# Patient Record
Sex: MALE | Race: OTHER | Employment: UNEMPLOYED | URBAN - METROPOLITAN AREA
[De-identification: names, ages, dates, MRNs, and addresses within clinical notes are randomized per-mention and may not be internally consistent; named-entity substitution may affect disease eponyms.]

---

## 2024-08-27 ENCOUNTER — APPOINTMENT (EMERGENCY)
Dept: CT IMAGING | Facility: HOSPITAL | Age: 49
DRG: 720 | End: 2024-08-27
Payer: COMMERCIAL

## 2024-08-27 ENCOUNTER — HOSPITAL ENCOUNTER (INPATIENT)
Facility: HOSPITAL | Age: 49
LOS: 2 days | Discharge: HOME | DRG: 720 | End: 2024-08-29
Attending: EMERGENCY MEDICINE | Admitting: INTERNAL MEDICINE
Payer: COMMERCIAL

## 2024-08-27 ENCOUNTER — APPOINTMENT (EMERGENCY)
Dept: ULTRASOUND IMAGING | Facility: HOSPITAL | Age: 49
DRG: 720 | End: 2024-08-27
Payer: COMMERCIAL

## 2024-08-27 DIAGNOSIS — N39.0 UTI (URINARY TRACT INFECTION): ICD-10-CM

## 2024-08-27 DIAGNOSIS — N20.0 NEPHROLITHIASIS: ICD-10-CM

## 2024-08-27 DIAGNOSIS — N20.1 URETERAL CALCULI: Primary | ICD-10-CM

## 2024-08-27 PROBLEM — E87.6 HYPOKALEMIA: Status: ACTIVE | Noted: 2024-08-27

## 2024-08-27 PROBLEM — R65.10 SIRS (SYSTEMIC INFLAMMATORY RESPONSE SYNDROME) (HCC): Status: ACTIVE | Noted: 2024-08-27

## 2024-08-27 LAB
ANION GAP SERPL CALCULATED.3IONS-SCNC: 7 MMOL/L (ref 4–13)
BACTERIA UR QL AUTO: ABNORMAL /HPF
BASOPHILS # BLD AUTO: 0.06 THOUSANDS/ÂΜL (ref 0–0.1)
BASOPHILS NFR BLD AUTO: 0 % (ref 0–1)
BILIRUB UR QL STRIP: NEGATIVE
BUN SERPL-MCNC: 11 MG/DL (ref 5–25)
CALCIUM SERPL-MCNC: 8.9 MG/DL (ref 8.4–10.2)
CHLORIDE SERPL-SCNC: 104 MMOL/L (ref 96–108)
CLARITY UR: CLEAR
CO2 SERPL-SCNC: 24 MMOL/L (ref 21–32)
COLOR UR: YELLOW
CREAT SERPL-MCNC: 0.83 MG/DL (ref 0.6–1.3)
EOSINOPHIL # BLD AUTO: 0.05 THOUSAND/ÂΜL (ref 0–0.61)
EOSINOPHIL NFR BLD AUTO: 0 % (ref 0–6)
ERYTHROCYTE [DISTWIDTH] IN BLOOD BY AUTOMATED COUNT: 13.2 % (ref 11.6–15.1)
FLUAV RNA RESP QL NAA+PROBE: NEGATIVE
FLUBV RNA RESP QL NAA+PROBE: NEGATIVE
GFR SERPL CREATININE-BSD FRML MDRD: 105 ML/MIN/1.73SQ M
GLUCOSE SERPL-MCNC: 112 MG/DL (ref 65–140)
GLUCOSE UR STRIP-MCNC: NEGATIVE MG/DL
HCT VFR BLD AUTO: 41.7 % (ref 36.5–49.3)
HGB BLD-MCNC: 14 G/DL (ref 12–17)
HGB UR QL STRIP.AUTO: ABNORMAL
IMM GRANULOCYTES # BLD AUTO: 0.1 THOUSAND/UL (ref 0–0.2)
IMM GRANULOCYTES NFR BLD AUTO: 1 % (ref 0–2)
KETONES UR STRIP-MCNC: ABNORMAL MG/DL
LEUKOCYTE ESTERASE UR QL STRIP: ABNORMAL
LYMPHOCYTES # BLD AUTO: 1.4 THOUSANDS/ÂΜL (ref 0.6–4.47)
LYMPHOCYTES NFR BLD AUTO: 7 % (ref 14–44)
MCH RBC QN AUTO: 29.9 PG (ref 26.8–34.3)
MCHC RBC AUTO-ENTMCNC: 33.6 G/DL (ref 31.4–37.4)
MCV RBC AUTO: 89 FL (ref 82–98)
MONOCYTES # BLD AUTO: 1.24 THOUSAND/ÂΜL (ref 0.17–1.22)
MONOCYTES NFR BLD AUTO: 7 % (ref 4–12)
MUCOUS THREADS UR QL AUTO: ABNORMAL
NEUTROPHILS # BLD AUTO: 15.97 THOUSANDS/ÂΜL (ref 1.85–7.62)
NEUTS SEG NFR BLD AUTO: 85 % (ref 43–75)
NITRITE UR QL STRIP: NEGATIVE
NON-SQ EPI CELLS URNS QL MICRO: ABNORMAL /HPF
NRBC BLD AUTO-RTO: 0 /100 WBCS
PH UR STRIP.AUTO: 7 [PH] (ref 4.5–8)
PLATELET # BLD AUTO: 201 THOUSANDS/UL (ref 149–390)
PMV BLD AUTO: 9.6 FL (ref 8.9–12.7)
POTASSIUM SERPL-SCNC: 3.4 MMOL/L (ref 3.5–5.3)
PROT UR STRIP-MCNC: NEGATIVE MG/DL
RBC # BLD AUTO: 4.69 MILLION/UL (ref 3.88–5.62)
RBC #/AREA URNS AUTO: ABNORMAL /HPF
RSV RNA RESP QL NAA+PROBE: NEGATIVE
SARS-COV-2 RNA RESP QL NAA+PROBE: NEGATIVE
SODIUM SERPL-SCNC: 135 MMOL/L (ref 135–147)
SP GR UR STRIP.AUTO: 1.01 (ref 1–1.03)
UROBILINOGEN UR QL STRIP.AUTO: 0.2 E.U./DL
WBC # BLD AUTO: 18.82 THOUSAND/UL (ref 4.31–10.16)
WBC #/AREA URNS AUTO: ABNORMAL /HPF

## 2024-08-27 PROCEDURE — 96375 TX/PRO/DX INJ NEW DRUG ADDON: CPT

## 2024-08-27 PROCEDURE — 99233 SBSQ HOSP IP/OBS HIGH 50: CPT | Performed by: INTERNAL MEDICINE

## 2024-08-27 PROCEDURE — 87077 CULTURE AEROBIC IDENTIFY: CPT

## 2024-08-27 PROCEDURE — 87186 SC STD MICRODIL/AGAR DIL: CPT

## 2024-08-27 PROCEDURE — 96376 TX/PRO/DX INJ SAME DRUG ADON: CPT

## 2024-08-27 PROCEDURE — 99223 1ST HOSP IP/OBS HIGH 75: CPT | Performed by: INTERNAL MEDICINE

## 2024-08-27 PROCEDURE — 0241U HB NFCT DS VIR RESP RNA 4 TRGT: CPT | Performed by: PHYSICIAN ASSISTANT

## 2024-08-27 PROCEDURE — 99284 EMERGENCY DEPT VISIT MOD MDM: CPT

## 2024-08-27 PROCEDURE — 76775 US EXAM ABDO BACK WALL LIM: CPT

## 2024-08-27 PROCEDURE — 85025 COMPLETE CBC W/AUTO DIFF WBC: CPT | Performed by: PHYSICIAN ASSISTANT

## 2024-08-27 PROCEDURE — 96361 HYDRATE IV INFUSION ADD-ON: CPT

## 2024-08-27 PROCEDURE — 81001 URINALYSIS AUTO W/SCOPE: CPT

## 2024-08-27 PROCEDURE — 96365 THER/PROPH/DIAG IV INF INIT: CPT

## 2024-08-27 PROCEDURE — 87086 URINE CULTURE/COLONY COUNT: CPT

## 2024-08-27 PROCEDURE — 36415 COLL VENOUS BLD VENIPUNCTURE: CPT | Performed by: PHYSICIAN ASSISTANT

## 2024-08-27 PROCEDURE — 74177 CT ABD & PELVIS W/CONTRAST: CPT

## 2024-08-27 PROCEDURE — 80048 BASIC METABOLIC PNL TOTAL CA: CPT | Performed by: PHYSICIAN ASSISTANT

## 2024-08-27 RX ORDER — KETOROLAC TROMETHAMINE 30 MG/ML
15 INJECTION, SOLUTION INTRAMUSCULAR; INTRAVENOUS ONCE
Status: COMPLETED | OUTPATIENT
Start: 2024-08-27 | End: 2024-08-27

## 2024-08-27 RX ORDER — SODIUM CHLORIDE, SODIUM GLUCONATE, SODIUM ACETATE, POTASSIUM CHLORIDE, MAGNESIUM CHLORIDE, SODIUM PHOSPHATE, DIBASIC, AND POTASSIUM PHOSPHATE .53; .5; .37; .037; .03; .012; .00082 G/100ML; G/100ML; G/100ML; G/100ML; G/100ML; G/100ML; G/100ML
125 INJECTION, SOLUTION INTRAVENOUS CONTINUOUS
Status: DISPENSED | OUTPATIENT
Start: 2024-08-27 | End: 2024-08-28

## 2024-08-27 RX ORDER — ONDANSETRON 2 MG/ML
4 INJECTION INTRAMUSCULAR; INTRAVENOUS EVERY 6 HOURS PRN
Status: DISCONTINUED | OUTPATIENT
Start: 2024-08-27 | End: 2024-08-28 | Stop reason: SDUPTHER

## 2024-08-27 RX ORDER — KETOROLAC TROMETHAMINE 30 MG/ML
30 INJECTION, SOLUTION INTRAMUSCULAR; INTRAVENOUS EVERY 6 HOURS PRN
Status: DISCONTINUED | OUTPATIENT
Start: 2024-08-27 | End: 2024-08-29 | Stop reason: HOSPADM

## 2024-08-27 RX ORDER — MORPHINE SULFATE 4 MG/ML
4 INJECTION, SOLUTION INTRAMUSCULAR; INTRAVENOUS ONCE
Status: COMPLETED | OUTPATIENT
Start: 2024-08-27 | End: 2024-08-27

## 2024-08-27 RX ORDER — ACETAMINOPHEN 325 MG/1
650 TABLET ORAL EVERY 6 HOURS PRN
Status: DISCONTINUED | OUTPATIENT
Start: 2024-08-27 | End: 2024-08-27 | Stop reason: SDUPTHER

## 2024-08-27 RX ORDER — ACETAMINOPHEN 325 MG/1
650 TABLET ORAL EVERY 6 HOURS PRN
Status: DISCONTINUED | OUTPATIENT
Start: 2024-08-27 | End: 2024-08-29 | Stop reason: HOSPADM

## 2024-08-27 RX ORDER — TAMSULOSIN HYDROCHLORIDE 0.4 MG/1
0.8 CAPSULE ORAL
Status: DISCONTINUED | OUTPATIENT
Start: 2024-08-27 | End: 2024-08-29 | Stop reason: HOSPADM

## 2024-08-27 RX ORDER — KETOROLAC TROMETHAMINE 30 MG/ML
15 INJECTION, SOLUTION INTRAMUSCULAR; INTRAVENOUS EVERY 6 HOURS PRN
Status: DISCONTINUED | OUTPATIENT
Start: 2024-08-27 | End: 2024-08-29 | Stop reason: HOSPADM

## 2024-08-27 RX ORDER — POTASSIUM CHLORIDE 1500 MG/1
40 TABLET, EXTENDED RELEASE ORAL
Status: COMPLETED | OUTPATIENT
Start: 2024-08-27 | End: 2024-08-27

## 2024-08-27 RX ORDER — DOCUSATE SODIUM 100 MG/1
100 CAPSULE, LIQUID FILLED ORAL 2 TIMES DAILY
Status: DISCONTINUED | OUTPATIENT
Start: 2024-08-28 | End: 2024-08-29 | Stop reason: HOSPADM

## 2024-08-27 RX ADMIN — DEXTROSE 1000 MG: 50 INJECTION, SOLUTION INTRAVENOUS at 17:26

## 2024-08-27 RX ADMIN — SODIUM CHLORIDE 1000 ML: 0.9 INJECTION, SOLUTION INTRAVENOUS at 14:35

## 2024-08-27 RX ADMIN — TAMSULOSIN HYDROCHLORIDE 0.8 MG: 0.4 CAPSULE ORAL at 23:50

## 2024-08-27 RX ADMIN — SODIUM CHLORIDE, SODIUM GLUCONATE, SODIUM ACETATE, POTASSIUM CHLORIDE, MAGNESIUM CHLORIDE, SODIUM PHOSPHATE, DIBASIC, AND POTASSIUM PHOSPHATE 125 ML/HR: .53; .5; .37; .037; .03; .012; .00082 INJECTION, SOLUTION INTRAVENOUS at 23:51

## 2024-08-27 RX ADMIN — IOHEXOL 100 ML: 350 INJECTION, SOLUTION INTRAVENOUS at 14:42

## 2024-08-27 RX ADMIN — ACETAMINOPHEN 650 MG: 325 TABLET ORAL at 22:30

## 2024-08-27 RX ADMIN — POTASSIUM CHLORIDE 40 MEQ: 1500 TABLET, EXTENDED RELEASE ORAL at 20:17

## 2024-08-27 RX ADMIN — KETOROLAC TROMETHAMINE 15 MG: 30 INJECTION, SOLUTION INTRAMUSCULAR; INTRAVENOUS at 13:57

## 2024-08-27 RX ADMIN — POTASSIUM CHLORIDE 40 MEQ: 1500 TABLET, EXTENDED RELEASE ORAL at 22:30

## 2024-08-27 RX ADMIN — MORPHINE SULFATE 4 MG: 4 INJECTION INTRAVENOUS at 17:26

## 2024-08-27 RX ADMIN — MORPHINE SULFATE 2 MG: 2 INJECTION, SOLUTION INTRAMUSCULAR; INTRAVENOUS at 15:08

## 2024-08-27 NOTE — ED NOTES
Patient instructed to change into gown for CT scan. Pt aware urine sample is needed. Unable to void at this time, given cup fof water per JANEEN Wakefield RN  08/27/24 2197

## 2024-08-27 NOTE — TREATMENT TEAM
Andrew is a 48 year old Bulgarian speaking male with history of nephrolithiasis who presented to [facility] ED 8/27 with RIGHT sided abdominal pain. Afebrile, creatinine stable, WBC elevated at 18.82. Urine is not obviously infected - given rocephin as a prophylactic measure in the ED. Await urine culture results. CT a/p revealed: 7 mm proximal LEFT ureteral calculus and 6 mm distal LEFT ureteral calculus without significant associated hydroureteronephrosis. Extensive bilateral nephrolithiasis. Prostatomegaly.    Patient denies left flank pain despite location of stones. Case discussed with Dr. Patel - admit patient to internal medicine overnight for MET, pain control and observation. Continue broad spectrum antibiotics, await culture. Repeat kidney/bladder ultrasound in AM to re-evaluate stone status. NPO at midnight incase surgical intervention is needed. Official consult will be completed by urology team tomorrow.    Corine Wagner PA-C

## 2024-08-27 NOTE — ASSESSMENT & PLAN NOTE
"Patient presents to the ED with right-sided flank pain; complains of dysuria and urinary hesitancy  Has long history of nephrolithiasis bilaterally; follows with urology in the outpatient setting  CT in the ED revealed the following:  \"7 mm proximal left ureteral calculus and 6 mm distal left ureteral calculus without significant associated hydroureteronephrosis.  Extensive bilateral nephrolithiasis.  Prostatomegaly.\"  Urology consulted, recommend admit for medical expulsion therapy  Also recommend continuing antibiotics and repeat kidney bladder ultrasound in the a.m.  IV fluids, Flomax, Toradol for pain control  N.p.o. at midnight in case cystoscopy is required  Monitor ins and outs; urinary retention protocol  "

## 2024-08-27 NOTE — LETTER
[place] [id number]  [address] [facility] [address]  Dept: [phone]    August 29, 2024     Patient: Andrew Mcfadden   YOB: 1975   Date of Visit: 8/27/2024       To Whom it May Concern:    Andrew Mcfadden is under my professional care. He was seen in the hospital from 8/27/2024 to 08/29/24. He may return to work on 9/3/2024 with the following limitations : do not lift over 20 lbs until cleared by outpatient doctor .    If you have any questions or concerns, please don't hesitate to call.         Sincerely,          Levi Lee PA-C

## 2024-08-27 NOTE — ED CARE HANDOFF
"Emergency Department Sign Out Note        Sign out and transfer of care from Gus Burrell PA-C. See Separate Emergency Department note.     The patient, Andrew Mcfadden, was evaluated by the previous provider for flank pain. See previous provider's note for full HPI. \"dysuria, urinary hesitancy over the past day as well as increased low back/flank pain over similar time. He actually localizes the pain to R lower back/flank, although somewhat positional. He reports it feels like previous renal stones. He notes subjective fever, chills since last night.\"    Workup Completed:  Results Reviewed       Procedure Component Value Units Date/Time    FLU/RSV/COVID - if FLU/RSV clinically relevant [[id number]]  (Normal) Collected: 08/27/24 1726    Lab Status: Final result Specimen: Nares from Nose Updated: 08/27/24 1811     SARS-CoV-2 Negative     INFLUENZA A PCR Negative     INFLUENZA B PCR Negative     RSV PCR Negative    Narrative:      This test has been performed using the CoV-2/Flu/RSV plus assay on the [employer] GeneXpert platform. This test has been validated by the  and verified by the performing laboratory.     This test is designed to amplify and detect the following: nucleocapsid (N), envelope (E), and RNA-dependent RNA polymerase (RdRP) genes of the SARS-CoV-2 genome; matrix (M), basic polymerase (PB2), and acidic protein (PA) segments of the influenza A genome; matrix (M) and non-structural protein (NS) segments of the influenza B genome, and the nucleocapsid genes of RSV A and RSV B.     Positive results are indicative of the presence of Flu A, Flu B, RSV, and/or SARS-CoV-2 RNA. Positive results for SARS-CoV-2 or suspected novel influenza should be reported to state, local, or federal health departments according to local reporting requirements.      All results should be assessed in conjunction with clinical presentation and other laboratory markers for clinical management.     FOR PEDIATRIC " PATIENTS - copy/paste COVID Guidelines URL to browser: [email or web address]       Urine Microscopic [[id number]]  (Abnormal) Collected: 08/27/24 1549    Lab Status: Final result Specimen: Urine, Clean Catch Updated: 08/27/24 1623     RBC, UA 4-10 /hpf      WBC, UA 20-30 /hpf      Epithelial Cells Occasional /hpf      Bacteria, UA Occasional /hpf      MUCUS THREADS Occasional    Urine culture [[id number]] Collected: 08/27/24 1549    Lab Status: In process Specimen: Urine, Clean Catch Updated: 08/27/24 1623    Urine Macroscopic, POC [[id number]]  (Abnormal) Collected: 08/27/24 1549    Lab Status: Final result Specimen: Urine Updated: 08/27/24 1550     Color, UA Yellow     Clarity, UA Clear     pH, UA 7.0     Leukocytes, UA Small     Nitrite, UA Negative     Protein, UA Negative mg/dl      Glucose, UA Negative mg/dl      Ketones, UA Trace mg/dl      Urobilinogen, UA 0.2 E.U./dl      Bilirubin, UA Negative     Occult Blood, UA Trace     Specific Gravity, UA 1.010    Narrative:      CLINITEK RESULT    Basic metabolic panel [[id number]]  (Abnormal) Collected: 08/27/24 1359    Lab Status: Final result Specimen: Blood from Arm, Left Updated: 08/27/24 1427     Sodium 135 mmol/L      Potassium 3.4 mmol/L      Chloride 104 mmol/L      CO2 24 mmol/L      ANION GAP 7 mmol/L      BUN 11 mg/dL      Creatinine 0.83 mg/dL      Glucose 112 mg/dL      Calcium 8.9 mg/dL      eGFR 105 ml/min/1.73sq m     Narrative:      National Kidney Disease Foundation guidelines for Chronic Kidney Disease (CKD):     Stage 1 with normal or high GFR (GFR > 90 mL/min/1.73 square meters)    Stage 2 Mild CKD (GFR = 60-89 mL/min/1.73 square meters)    Stage 3A Moderate CKD (GFR = 45-59 mL/min/1.73 square meters)    Stage 3B Moderate CKD (GFR = 30-44 mL/min/1.73 square meters)    Stage 4 Severe CKD (GFR = 15-29 mL/min/1.73 square meters)    Stage 5 End Stage CKD (GFR <15 mL/min/1.73 square meters)  Note: GFR  calculation is accurate only with a steady state creatinine    CBC and differential [[id number]]  (Abnormal) Collected: 08/27/24 1359    Lab Status: Final result Specimen: Blood from Arm, Left Updated: 08/27/24 1407     WBC 18.82 Thousand/uL      RBC 4.69 Million/uL      Hemoglobin 14.0 g/dL      Hematocrit 41.7 %      MCV 89 fL      MCH 29.9 pg      MCHC 33.6 g/dL      RDW 13.2 %      MPV 9.6 fL      Platelets 201 Thousands/uL      nRBC 0 /100 WBCs      Segmented % 85 %      Immature Grans % 1 %      Lymphocytes % 7 %      Monocytes % 7 %      Eosinophils Relative 0 %      Basophils Relative 0 %      Absolute Neutrophils 15.97 Thousands/µL      Absolute Immature Grans 0.10 Thousand/uL      Absolute Lymphocytes 1.40 Thousands/µL      Absolute Monocytes 1.24 Thousand/µL      Eosinophils Absolute 0.05 Thousand/µL      Basophils Absolute 0.06 Thousands/µL           CT abdomen pelvis with contrast   Final Result by Bayron Foley MD (08/27 1534)      7 mm proximal left ureteral calculus and 6 mm distal left ureteral calculus without significant associated hydroureteronephrosis.   Extensive bilateral nephrolithiasis.   Prostatomegaly.         Workstation performed: [id number]         US kidney and bladder    (Results Pending)         ED Course / Workup Pending (followup):            ED Course as of 08/27/24 1847   Tue Aug 27, 2024   1714 S/o from [facility]: right lower back anf flank pain, urinary burning and hesitancy x1 day. History of kidney stones, Ct shows left sided stones, nonobstructing. Leukocytosis. UTI. Urology recommended admit to [facility], NPO midnight. Pt unclear if he wants to stay. Recheck after meds are given.    1822 Pt agreeable to stay    1825 Message sent to [facility]     Procedures  Medical Decision Making  Amount and/or Complexity of Data Reviewed  Labs: ordered.  Radiology: ordered.    Risk  Prescription drug management.  Decision regarding hospitalization.            Disposition  Final diagnoses:   UTI (urinary  tract infection)   Nephrolithiasis   Ureteral calculi     Time reflects when diagnosis was documented in both MDM as applicable and the Disposition within this note       Time User Action Codes Description Comment    8/27/2024  6:33 PM Bishop Franco Add [N39.0] UTI (urinary tract infection)     8/27/2024  6:33 PM Bishop Franco Add [N20.0] Nephrolithiasis     8/27/2024  6:34 PM Bishop Franco Add [N20.1] Ureteral calculi     8/27/2024  6:34 PM Bishop Franco Modify [N39.0] UTI (urinary tract infection)     8/27/2024  6:34 PM Bishop Franco Modify [N20.1] Ureteral calculi           ED Disposition       ED Disposition   Admit    Condition   Stable    Date/Time   Tue Aug 27, 2024  6:46 PM    Comment   Case was discussed with JEAN and the patient's admission status was agreed to be Admission Status: inpatient status to the service of Dr. Mejia               Follow-up Information    None       Patient's Medications    No medications on file     No discharge procedures on file.       ED Provider  Electronically Signed by     Bishop Franco PA-C  08/27/24 7317

## 2024-08-27 NOTE — Clinical Note
Case was discussed with JEAN and the patient's admission status was agreed to be Admission Status: observation status to the service of Dr. Mejia

## 2024-08-27 NOTE — LETTER
[place] [address]  [address] [facility] [address]  Dept: [phone]    August 29, 2024     Patient: Andrew Mcfadden   YOB: 1975   Date of Visit: 8/27/2024       To Whom it May Concern:    Andrew Mcfadden is under my professional care. He was seen in the hospital from 8/27/2024 to 08/29/24. He {Return to school/sport/work:55562}.    If you have any questions or concerns, please don't hesitate to call.         Sincerely,          Levi Lee PA-C

## 2024-08-27 NOTE — LETTER
[place] [id number]  [address] [facility] [address]  Dept: [phone]    August 29, 2024     Patient: Andrew Mcfadden   YOB: 1975   Date of Visit: 8/27/2024       To Whom it May Concern:    Andrew Mcfadden is under my professional care. He was seen in the hospital from 8/27/2024 to 08/29/24. He {Return to school/sport/work:95135}.    If you have any questions or concerns, please don't hesitate to call.         Sincerely,          Levi eLe PA-C

## 2024-08-27 NOTE — ASSESSMENT & PLAN NOTE
Patient met SIRS criteria with elevated white count (18.82) and tachycardia  Likely urinary source given dysuria and urinary hesitancy in the setting of nephrolithiasis  However, UA somewhat bland, may have been received after antibiotics initiated in the ED  Nonetheless, will wait for cultures, empiric antibiotics with Rocephin; IV fluids  Monitor for other sources; low threshold to obtain blood cultures if patient becomes febrile

## 2024-08-28 ENCOUNTER — ANESTHESIA EVENT (INPATIENT)
Dept: PERIOP | Facility: HOSPITAL | Age: 49
DRG: 720 | End: 2024-08-28
Payer: COMMERCIAL

## 2024-08-28 ENCOUNTER — TELEPHONE (OUTPATIENT)
Dept: UROLOGY | Facility: MEDICAL CENTER | Age: 49
End: 2024-08-28

## 2024-08-28 ENCOUNTER — APPOINTMENT (INPATIENT)
Dept: RADIOLOGY | Facility: HOSPITAL | Age: 49
DRG: 720 | End: 2024-08-28
Payer: COMMERCIAL

## 2024-08-28 ENCOUNTER — ANESTHESIA (INPATIENT)
Dept: PERIOP | Facility: HOSPITAL | Age: 49
DRG: 720 | End: 2024-08-28
Payer: COMMERCIAL

## 2024-08-28 DIAGNOSIS — N20.1 URETERAL CALCULUS: Primary | ICD-10-CM

## 2024-08-28 PROBLEM — E87.6 HYPOKALEMIA: Status: RESOLVED | Noted: 2024-08-27 | Resolved: 2024-08-28

## 2024-08-28 LAB
ALBUMIN SERPL BCG-MCNC: 3.7 G/DL (ref 3.5–5)
ALP SERPL-CCNC: 74 U/L (ref 34–104)
ALT SERPL W P-5'-P-CCNC: 9 U/L (ref 7–52)
ANION GAP SERPL CALCULATED.3IONS-SCNC: 6 MMOL/L (ref 4–13)
AST SERPL W P-5'-P-CCNC: 12 U/L (ref 13–39)
BASOPHILS # BLD AUTO: 0.06 THOUSANDS/ÂΜL (ref 0–0.1)
BASOPHILS NFR BLD AUTO: 0 % (ref 0–1)
BILIRUB SERPL-MCNC: 2.55 MG/DL (ref 0.2–1)
BUN SERPL-MCNC: 9 MG/DL (ref 5–25)
CALCIUM SERPL-MCNC: 8.4 MG/DL (ref 8.4–10.2)
CHLORIDE SERPL-SCNC: 104 MMOL/L (ref 96–108)
CO2 SERPL-SCNC: 22 MMOL/L (ref 21–32)
CREAT SERPL-MCNC: 0.74 MG/DL (ref 0.6–1.3)
EOSINOPHIL # BLD AUTO: 0.01 THOUSAND/ÂΜL (ref 0–0.61)
EOSINOPHIL NFR BLD AUTO: 0 % (ref 0–6)
ERYTHROCYTE [DISTWIDTH] IN BLOOD BY AUTOMATED COUNT: 13.3 % (ref 11.6–15.1)
GFR SERPL CREATININE-BSD FRML MDRD: 110 ML/MIN/1.73SQ M
GLUCOSE SERPL-MCNC: 125 MG/DL (ref 65–140)
HCT VFR BLD AUTO: 37.7 % (ref 36.5–49.3)
HGB BLD-MCNC: 12.5 G/DL (ref 12–17)
IMM GRANULOCYTES # BLD AUTO: 0.15 THOUSAND/UL (ref 0–0.2)
IMM GRANULOCYTES NFR BLD AUTO: 1 % (ref 0–2)
LYMPHOCYTES # BLD AUTO: 1.3 THOUSANDS/ÂΜL (ref 0.6–4.47)
LYMPHOCYTES NFR BLD AUTO: 6 % (ref 14–44)
MAGNESIUM SERPL-MCNC: 1.9 MG/DL (ref 1.9–2.7)
MCH RBC QN AUTO: 29.8 PG (ref 26.8–34.3)
MCHC RBC AUTO-ENTMCNC: 33.2 G/DL (ref 31.4–37.4)
MCV RBC AUTO: 90 FL (ref 82–98)
MONOCYTES # BLD AUTO: 1.84 THOUSAND/ÂΜL (ref 0.17–1.22)
MONOCYTES NFR BLD AUTO: 9 % (ref 4–12)
NEUTROPHILS # BLD AUTO: 17.61 THOUSANDS/ÂΜL (ref 1.85–7.62)
NEUTS SEG NFR BLD AUTO: 84 % (ref 43–75)
NRBC BLD AUTO-RTO: 0 /100 WBCS
PHOSPHATE SERPL-MCNC: 2.6 MG/DL (ref 2.7–4.5)
PLATELET # BLD AUTO: 162 THOUSANDS/UL (ref 149–390)
PMV BLD AUTO: 10 FL (ref 8.9–12.7)
POTASSIUM SERPL-SCNC: 3.6 MMOL/L (ref 3.5–5.3)
PROT SERPL-MCNC: 6.9 G/DL (ref 6.4–8.4)
RBC # BLD AUTO: 4.2 MILLION/UL (ref 3.88–5.62)
SODIUM SERPL-SCNC: 132 MMOL/L (ref 135–147)
WBC # BLD AUTO: 20.97 THOUSAND/UL (ref 4.31–10.16)

## 2024-08-28 PROCEDURE — 80053 COMPREHEN METABOLIC PANEL: CPT | Performed by: INTERNAL MEDICINE

## 2024-08-28 PROCEDURE — C2617 STENT, NON-COR, TEM W/O DEL: HCPCS | Performed by: UROLOGY

## 2024-08-28 PROCEDURE — 74420 UROGRAPHY RTRGR +-KUB: CPT

## 2024-08-28 PROCEDURE — 99285 EMERGENCY DEPT VISIT HI MDM: CPT | Performed by: PHYSICIAN ASSISTANT

## 2024-08-28 PROCEDURE — 83735 ASSAY OF MAGNESIUM: CPT | Performed by: INTERNAL MEDICINE

## 2024-08-28 PROCEDURE — 0TCB8ZZ EXTIRPATION OF MATTER FROM BLADDER, VIA NATURAL OR ARTIFICIAL OPENING ENDOSCOPIC: ICD-10-PCS | Performed by: UROLOGY

## 2024-08-28 PROCEDURE — 99232 SBSQ HOSP IP/OBS MODERATE 35: CPT

## 2024-08-28 PROCEDURE — 85025 COMPLETE CBC W/AUTO DIFF WBC: CPT | Performed by: INTERNAL MEDICINE

## 2024-08-28 PROCEDURE — 0T778DZ DILATION OF LEFT URETER WITH INTRALUMINAL DEVICE, VIA NATURAL OR ARTIFICIAL OPENING ENDOSCOPIC: ICD-10-PCS | Performed by: UROLOGY

## 2024-08-28 PROCEDURE — 82360 CALCULUS ASSAY QUANT: CPT | Performed by: UROLOGY

## 2024-08-28 PROCEDURE — 99222 1ST HOSP IP/OBS MODERATE 55: CPT | Performed by: UROLOGY

## 2024-08-28 PROCEDURE — 84100 ASSAY OF PHOSPHORUS: CPT | Performed by: INTERNAL MEDICINE

## 2024-08-28 PROCEDURE — C1769 GUIDE WIRE: HCPCS | Performed by: UROLOGY

## 2024-08-28 PROCEDURE — 52332 CYSTOSCOPY AND TREATMENT: CPT | Performed by: UROLOGY

## 2024-08-28 DEVICE — VARIABLE LENGTH INJECTION STENT SET
Type: IMPLANTABLE DEVICE | Site: URETER | Status: FUNCTIONAL
Brand: CONTOUR VL™ INJECTION STENT SET

## 2024-08-28 RX ORDER — SUCCINYLCHOLINE/SOD CL,ISO/PF 100 MG/5ML
SYRINGE (ML) INTRAVENOUS AS NEEDED
Status: DISCONTINUED | OUTPATIENT
Start: 2024-08-28 | End: 2024-08-28

## 2024-08-28 RX ORDER — OXYBUTYNIN CHLORIDE 5 MG/1
5 TABLET ORAL EVERY 6 HOURS PRN
Status: DISCONTINUED | OUTPATIENT
Start: 2024-08-28 | End: 2024-08-29 | Stop reason: HOSPADM

## 2024-08-28 RX ORDER — DEXAMETHASONE SODIUM PHOSPHATE 10 MG/ML
INJECTION, SOLUTION INTRAMUSCULAR; INTRAVENOUS AS NEEDED
Status: DISCONTINUED | OUTPATIENT
Start: 2024-08-28 | End: 2024-08-28

## 2024-08-28 RX ORDER — ACETAMINOPHEN 325 MG/1
650 TABLET ORAL EVERY 6 HOURS SCHEDULED
Status: DISCONTINUED | OUTPATIENT
Start: 2024-08-28 | End: 2024-08-29 | Stop reason: HOSPADM

## 2024-08-28 RX ORDER — FENTANYL CITRATE 50 UG/ML
50 INJECTION, SOLUTION INTRAMUSCULAR; INTRAVENOUS
Status: DISCONTINUED | OUTPATIENT
Start: 2024-08-28 | End: 2024-08-28 | Stop reason: HOSPADM

## 2024-08-28 RX ORDER — SODIUM CHLORIDE, SODIUM LACTATE, POTASSIUM CHLORIDE, CALCIUM CHLORIDE 600; 310; 30; 20 MG/100ML; MG/100ML; MG/100ML; MG/100ML
INJECTION, SOLUTION INTRAVENOUS CONTINUOUS PRN
Status: DISCONTINUED | OUTPATIENT
Start: 2024-08-28 | End: 2024-08-28

## 2024-08-28 RX ORDER — ONDANSETRON 2 MG/ML
4 INJECTION INTRAMUSCULAR; INTRAVENOUS EVERY 6 HOURS PRN
Status: DISCONTINUED | OUTPATIENT
Start: 2024-08-28 | End: 2024-08-29 | Stop reason: HOSPADM

## 2024-08-28 RX ORDER — SODIUM CHLORIDE 9 MG/ML
125 INJECTION, SOLUTION INTRAVENOUS CONTINUOUS
Status: DISCONTINUED | OUTPATIENT
Start: 2024-08-28 | End: 2024-08-29 | Stop reason: HOSPADM

## 2024-08-28 RX ORDER — OXYCODONE AND ACETAMINOPHEN 5; 325 MG/1; MG/1
2 TABLET ORAL EVERY 4 HOURS PRN
Status: DISCONTINUED | OUTPATIENT
Start: 2024-08-28 | End: 2024-08-29 | Stop reason: HOSPADM

## 2024-08-28 RX ORDER — CIPROFLOXACIN 2 MG/ML
400 INJECTION, SOLUTION INTRAVENOUS EVERY 12 HOURS
Status: DISCONTINUED | OUTPATIENT
Start: 2024-08-28 | End: 2024-08-29 | Stop reason: HOSPADM

## 2024-08-28 RX ORDER — FENTANYL CITRATE 50 UG/ML
INJECTION, SOLUTION INTRAMUSCULAR; INTRAVENOUS AS NEEDED
Status: DISCONTINUED | OUTPATIENT
Start: 2024-08-28 | End: 2024-08-28

## 2024-08-28 RX ORDER — ACETAMINOPHEN 10 MG/ML
1000 INJECTION, SOLUTION INTRAVENOUS ONCE
Status: COMPLETED | OUTPATIENT
Start: 2024-08-28 | End: 2024-08-28

## 2024-08-28 RX ORDER — MIDAZOLAM HYDROCHLORIDE 2 MG/2ML
INJECTION, SOLUTION INTRAMUSCULAR; INTRAVENOUS AS NEEDED
Status: DISCONTINUED | OUTPATIENT
Start: 2024-08-28 | End: 2024-08-28

## 2024-08-28 RX ORDER — SODIUM CHLORIDE 9 MG/ML
INJECTION, SOLUTION INTRAVENOUS CONTINUOUS PRN
Status: DISCONTINUED | OUTPATIENT
Start: 2024-08-28 | End: 2024-08-28

## 2024-08-28 RX ORDER — PROPOFOL 10 MG/ML
INJECTION, EMULSION INTRAVENOUS AS NEEDED
Status: DISCONTINUED | OUTPATIENT
Start: 2024-08-28 | End: 2024-08-28

## 2024-08-28 RX ORDER — PHENYLEPHRINE HCL IN 0.9% NACL 1 MG/10 ML
SYRINGE (ML) INTRAVENOUS AS NEEDED
Status: DISCONTINUED | OUTPATIENT
Start: 2024-08-28 | End: 2024-08-28

## 2024-08-28 RX ORDER — LIDOCAINE HYDROCHLORIDE 20 MG/ML
INJECTION, SOLUTION EPIDURAL; INFILTRATION; INTRACAUDAL; PERINEURAL AS NEEDED
Status: DISCONTINUED | OUTPATIENT
Start: 2024-08-28 | End: 2024-08-28

## 2024-08-28 RX ORDER — OXYCODONE AND ACETAMINOPHEN 5; 325 MG/1; MG/1
1 TABLET ORAL EVERY 4 HOURS PRN
Status: DISCONTINUED | OUTPATIENT
Start: 2024-08-28 | End: 2024-08-29 | Stop reason: HOSPADM

## 2024-08-28 RX ORDER — ONDANSETRON 2 MG/ML
4 INJECTION INTRAMUSCULAR; INTRAVENOUS EVERY 6 HOURS PRN
Status: DISCONTINUED | OUTPATIENT
Start: 2024-08-28 | End: 2024-08-28 | Stop reason: HOSPADM

## 2024-08-28 RX ADMIN — MIDAZOLAM 2 MG: 1 INJECTION INTRAMUSCULAR; INTRAVENOUS at 13:27

## 2024-08-28 RX ADMIN — KETOROLAC TROMETHAMINE 15 MG: 30 INJECTION, SOLUTION INTRAMUSCULAR; INTRAVENOUS at 23:27

## 2024-08-28 RX ADMIN — KETOROLAC TROMETHAMINE 15 MG: 30 INJECTION, SOLUTION INTRAMUSCULAR; INTRAVENOUS at 03:16

## 2024-08-28 RX ADMIN — KETOROLAC TROMETHAMINE 30 MG: 30 INJECTION, SOLUTION INTRAMUSCULAR; INTRAVENOUS at 08:20

## 2024-08-28 RX ADMIN — Medication 100 MG: at 13:32

## 2024-08-28 RX ADMIN — FENTANYL CITRATE 50 MCG: 50 INJECTION INTRAMUSCULAR; INTRAVENOUS at 13:50

## 2024-08-28 RX ADMIN — ACETAMINOPHEN 1000 MG: 10 INJECTION INTRAVENOUS at 13:32

## 2024-08-28 RX ADMIN — ACETAMINOPHEN 650 MG: 325 TABLET ORAL at 23:26

## 2024-08-28 RX ADMIN — PROPOFOL 50 MG: 10 INJECTION, EMULSION INTRAVENOUS at 13:55

## 2024-08-28 RX ADMIN — SODIUM CHLORIDE: 0.9 INJECTION, SOLUTION INTRAVENOUS at 13:44

## 2024-08-28 RX ADMIN — FENTANYL CITRATE 50 MCG: 50 INJECTION INTRAMUSCULAR; INTRAVENOUS at 13:55

## 2024-08-28 RX ADMIN — DEXAMETHASONE SODIUM PHOSPHATE 10 MG: 10 INJECTION INTRAMUSCULAR; INTRAVENOUS at 13:32

## 2024-08-28 RX ADMIN — TAMSULOSIN HYDROCHLORIDE 0.8 MG: 0.4 CAPSULE ORAL at 17:11

## 2024-08-28 RX ADMIN — SODIUM CHLORIDE 125 ML/HR: 0.9 INJECTION, SOLUTION INTRAVENOUS at 15:28

## 2024-08-28 RX ADMIN — LIDOCAINE HYDROCHLORIDE 80 MG: 20 INJECTION, SOLUTION EPIDURAL; INFILTRATION; INTRACAUDAL at 13:32

## 2024-08-28 RX ADMIN — FENTANYL CITRATE 50 MCG: 50 INJECTION INTRAMUSCULAR; INTRAVENOUS at 13:32

## 2024-08-28 RX ADMIN — FENTANYL CITRATE 50 MCG: 50 INJECTION INTRAMUSCULAR; INTRAVENOUS at 14:16

## 2024-08-28 RX ADMIN — Medication 100 MCG: at 14:02

## 2024-08-28 RX ADMIN — ONDANSETRON 4 MG: 2 INJECTION INTRAMUSCULAR; INTRAVENOUS at 13:27

## 2024-08-28 RX ADMIN — SODIUM CHLORIDE, SODIUM GLUCONATE, SODIUM ACETATE, POTASSIUM CHLORIDE, MAGNESIUM CHLORIDE, SODIUM PHOSPHATE, DIBASIC, AND POTASSIUM PHOSPHATE 125 ML/HR: .53; .5; .37; .037; .03; .012; .00082 INJECTION, SOLUTION INTRAVENOUS at 08:14

## 2024-08-28 RX ADMIN — ACETAMINOPHEN 650 MG: 325 TABLET ORAL at 03:13

## 2024-08-28 RX ADMIN — DEXTROSE 1000 MG: 50 INJECTION, SOLUTION INTRAVENOUS at 13:32

## 2024-08-28 RX ADMIN — SODIUM CHLORIDE 125 ML/HR: 0.9 INJECTION, SOLUTION INTRAVENOUS at 23:24

## 2024-08-28 RX ADMIN — PROPOFOL 200 MG: 10 INJECTION, EMULSION INTRAVENOUS at 13:32

## 2024-08-28 RX ADMIN — ACETAMINOPHEN 650 MG: 325 TABLET ORAL at 17:11

## 2024-08-28 NOTE — ED PROVIDER NOTES
History  Chief Complaint   Patient presents with    Difficulty Urinating     Pt c/o difficulty urinating with generalized whole body aches for the past x 2 days. Pt also c/o fever. Pt last took tylenol at 08:00 PTA. Pt denies cp/sob     Andrew is a 49 yo M, history of previous renal stones, presenting with lower back pain as well as dysuria, urinary hesitancy over the past day. The lower back/flank pain is located on the R side with slight R lower abdominal pain, denies left sided pain on arrival. No gross hematuria. He notes subjective fevers and chills over the past day as well.       History provided by:  Patient      None       Past Medical History:   Diagnosis Date    Kidney stones        Past Surgical History:   Procedure Laterality Date    ACHILLES TENDON SURGERY  2010       History reviewed. No pertinent family history.  I have reviewed and agree with the history as documented.    E-Cigarette/Vaping    E-Cigarette Use Never User      E-Cigarette/Vaping Substances     Social History     Tobacco Use    Smoking status: Never    Smokeless tobacco: Never   Vaping Use    Vaping status: Never Used   Substance Use Topics    Alcohol use: Never    Drug use: Never       Review of Systems   Constitutional:  Positive for fatigue and fever. Negative for chills.   HENT:  Negative for congestion, rhinorrhea and sore throat.    Eyes:  Negative for pain and visual disturbance.   Respiratory:  Negative for cough, shortness of breath and wheezing.    Cardiovascular:  Negative for chest pain and palpitations.   Gastrointestinal:  Negative for abdominal pain, diarrhea, nausea and vomiting.   Genitourinary:  Positive for flank pain. Negative for dysuria, frequency and urgency.   Musculoskeletal:  Positive for back pain and myalgias. Negative for neck pain and neck stiffness.   Skin:  Negative for rash and wound.   Neurological:  Negative for dizziness, weakness, light-headedness and numbness.       Physical Exam  Physical  Exam  Constitutional:       General: He is not in acute distress.     Appearance: He is well-developed. He is not diaphoretic.   HENT:      Head: Normocephalic and atraumatic.      Right Ear: External ear normal.      Left Ear: External ear normal.   Eyes:      Extraocular Movements:      Right eye: Normal extraocular motion.      Left eye: Normal extraocular motion.      Conjunctiva/sclera: Conjunctivae normal.      Pupils: Pupils are equal, round, and reactive to light.   Cardiovascular:      Rate and Rhythm: Normal rate and regular rhythm.   Pulmonary:      Effort: Pulmonary effort is normal. No accessory muscle usage or respiratory distress.      Breath sounds: No wheezing or rales.   Abdominal:      General: Abdomen is flat. There is no distension.      Tenderness: There is no abdominal tenderness. There is right CVA tenderness. There is no left CVA tenderness or guarding.      Comments: TTP to R lower back extending superiorly to CVA. No L CVAT   Musculoskeletal:      Cervical back: Normal range of motion. No rigidity.   Skin:     General: Skin is warm and dry.      Capillary Refill: Capillary refill takes less than 2 seconds.      Findings: No erythema or rash.   Neurological:      Mental Status: He is alert and oriented to person, place, and time.      Motor: No abnormal muscle tone.      Coordination: Coordination normal.   Psychiatric:         Behavior: Behavior normal.         Thought Content: Thought content normal.         Judgment: Judgment normal.         Vital Signs  ED Triage Vitals [08/27/24 1337]   Temperature Pulse Respirations Blood Pressure SpO2   98.8 °F (37.1 °C) (!) 109 18 154/88 99 %      Temp Source Heart Rate Source Patient Position - Orthostatic VS BP Location FiO2 (%)   Oral Monitor Sitting Right arm --      Pain Score       9           Vitals:    08/27/24 2312 08/27/24 2359 08/28/24 0243 08/28/24 0439   BP:       Pulse: 97 99 (!) 109 94   Patient Position - Orthostatic VS:              Visual Acuity      ED Medications  Medications   cefTRIAXone (ROCEPHIN) 1,000 mg in dextrose 5 % 50 mL IVPB (has no administration in time range)   tamsulosin (FLOMAX) capsule 0.8 mg (0.8 mg Oral Given 8/27/24 2350)   ketorolac (TORADOL) injection 15 mg (15 mg Intravenous Given 8/28/24 0316)   ketorolac (TORADOL) injection 30 mg (has no administration in time range)   multi-electrolyte (PLASMALYTE-A/ISOLYTE-S PH 7.4) IV solution (125 mL/hr Intravenous New Bag 8/27/24 2351)   docusate sodium (COLACE) capsule 100 mg (has no administration in time range)   ondansetron (ZOFRAN) injection 4 mg (has no administration in time range)   acetaminophen (TYLENOL) tablet 650 mg ( Oral Canceled Entry 8/28/24 0511)   ketorolac (TORADOL) injection 15 mg (15 mg Intravenous Given 8/27/24 1357)   sodium chloride 0.9 % bolus 1,000 mL (0 mL Intravenous Stopped 8/27/24 1552)   iohexol (OMNIPAQUE) 350 MG/ML injection (MULTI-DOSE) 100 mL (100 mL Intravenous Given 8/27/24 1442)   morphine injection 2 mg (2 mg Intravenous Given 8/27/24 1508)   ceftriaxone (ROCEPHIN) 1 g/50 mL in dextrose IVPB (0 mg Intravenous Stopped 8/27/24 1812)   morphine injection 4 mg (4 mg Intravenous Given 8/27/24 1726)   potassium chloride (Klor-Con M20) CR tablet 40 mEq (40 mEq Oral Given 8/27/24 2230)       Diagnostic Studies  Results Reviewed       Procedure Component Value Units Date/Time    FLU/RSV/COVID - if FLU/RSV clinically relevant [[id number]]  (Normal) Collected: 08/27/24 1726    Lab Status: Final result Specimen: Nares from Nose Updated: 08/27/24 1811     SARS-CoV-2 Negative     INFLUENZA A PCR Negative     INFLUENZA B PCR Negative     RSV PCR Negative    Narrative:      This test has been performed using the CoV-2/Flu/RSV plus assay on the [employer] GeneXpert platform. This test has been validated by the  and verified by the performing laboratory.     This test is designed to amplify and detect the following: nucleocapsid (N), envelope  (E), and RNA-dependent RNA polymerase (RdRP) genes of the SARS-CoV-2 genome; matrix (M), basic polymerase (PB2), and acidic protein (PA) segments of the influenza A genome; matrix (M) and non-structural protein (NS) segments of the influenza B genome, and the nucleocapsid genes of RSV A and RSV B.     Positive results are indicative of the presence of Flu A, Flu B, RSV, and/or SARS-CoV-2 RNA. Positive results for SARS-CoV-2 or suspected novel influenza should be reported to state, local, or federal health departments according to local reporting requirements.      All results should be assessed in conjunction with clinical presentation and other laboratory markers for clinical management.     FOR PEDIATRIC PATIENTS - copy/paste COVID Guidelines URL to browser: [email or web address]       Urine Microscopic [[id number]]  (Abnormal) Collected: 08/27/24 1549    Lab Status: Final result Specimen: Urine, Clean Catch Updated: 08/27/24 1623     RBC, UA 4-10 /hpf      WBC, UA 20-30 /hpf      Epithelial Cells Occasional /hpf      Bacteria, UA Occasional /hpf      MUCUS THREADS Occasional    Urine culture [[id number]] Collected: 08/27/24 1549    Lab Status: In process Specimen: Urine, Clean Catch Updated: 08/27/24 1623    Urine Macroscopic, POC [[id number]]  (Abnormal) Collected: 08/27/24 1549    Lab Status: Final result Specimen: Urine Updated: 08/27/24 1550     Color, UA Yellow     Clarity, UA Clear     pH, UA 7.0     Leukocytes, UA Small     Nitrite, UA Negative     Protein, UA Negative mg/dl      Glucose, UA Negative mg/dl      Ketones, UA Trace mg/dl      Urobilinogen, UA 0.2 E.U./dl      Bilirubin, UA Negative     Occult Blood, UA Trace     Specific Gravity, UA 1.010    Narrative:      CLINITEK RESULT    Basic metabolic panel [[id number]]  (Abnormal) Collected: 08/27/24 1359    Lab Status: Final result Specimen: Blood from Arm, Left Updated: 08/27/24 1427     Sodium 135  mmol/L      Potassium 3.4 mmol/L      Chloride 104 mmol/L      CO2 24 mmol/L      ANION GAP 7 mmol/L      BUN 11 mg/dL      Creatinine 0.83 mg/dL      Glucose 112 mg/dL      Calcium 8.9 mg/dL      eGFR 105 ml/min/1.73sq m     Narrative:      National Kidney Disease Foundation guidelines for Chronic Kidney Disease (CKD):     Stage 1 with normal or high GFR (GFR > 90 mL/min/1.73 square meters)    Stage 2 Mild CKD (GFR = 60-89 mL/min/1.73 square meters)    Stage 3A Moderate CKD (GFR = 45-59 mL/min/1.73 square meters)    Stage 3B Moderate CKD (GFR = 30-44 mL/min/1.73 square meters)    Stage 4 Severe CKD (GFR = 15-29 mL/min/1.73 square meters)    Stage 5 End Stage CKD (GFR <15 mL/min/1.73 square meters)  Note: GFR calculation is accurate only with a steady state creatinine    CBC and differential [[id number]]  (Abnormal) Collected: 08/27/24 1353    Lab Status: Final result Specimen: Blood from Arm, Left Updated: 08/27/24 1407     WBC 18.82 Thousand/uL      RBC 4.69 Million/uL      Hemoglobin 14.0 g/dL      Hematocrit 41.7 %      MCV 89 fL      MCH 29.9 pg      MCHC 33.6 g/dL      RDW 13.2 %      MPV 9.6 fL      Platelets 201 Thousands/uL      nRBC 0 /100 WBCs      Segmented % 85 %      Immature Grans % 1 %      Lymphocytes % 7 %      Monocytes % 7 %      Eosinophils Relative 0 %      Basophils Relative 0 %      Absolute Neutrophils 15.97 Thousands/µL      Absolute Immature Grans 0.10 Thousand/uL      Absolute Lymphocytes 1.40 Thousands/µL      Absolute Monocytes 1.24 Thousand/µL      Eosinophils Absolute 0.05 Thousand/µL      Basophils Absolute 0.06 Thousands/µL                    US kidney and bladder   Final Result by Rivera Smith MD (08/27 1944)      No evidence for hydronephrosis with multiple intrarenal calculi again noted and seen to better advantage on prior CT performed earlier the same day.      Please note that the calculi along the course of the proximal and distal left ureter should be correlated with earlier CT  and are not clearly imaged on the current exam.            Workstation performed: [id number]         CT abdomen pelvis with contrast   Final Result by Bayron Foley MD (08/27 1534)      7 mm proximal left ureteral calculus and 6 mm distal left ureteral calculus without significant associated hydroureteronephrosis.   Extensive bilateral nephrolithiasis.   Prostatomegaly.         Workstation performed: [id number]                    Procedures  Procedures         ED Course                                 SBIRT 20yo+      Flowsheet Row Most Recent Value   Initial Alcohol Screen: US AUDIT-C     1. How often do you have a drink containing alcohol? 0 Filed at: 08/27/2024 5655   2. How many drinks containing alcohol do you have on a typical day you are drinking?  0 Filed at: 08/27/2024 3576   3a. Male UNDER 65: How often do you have five or more drinks on one occasion? 0 Filed at: 08/27/2024 9656   3b. FEMALE Any Age, or MALE 65+: How often do you have 4 or more drinks on one occassion? 0 Filed at: 08/27/2024 5812   Audit-C Score 0 Filed at: 08/27/2024 1064   JUAN: How many times in the past year have you...    Used an illegal drug or used a prescription medication for non-medical reasons? Never Filed at: 08/27/2024 9810                      Medical Decision Making  R lower back/flank pain, dysuria, urinary hesitancy reported over the past day. Notes subjective fever, chills over similar time period. Lab workup reveals leukocytosis to 18. Urine dip equivocal however urine micro 20-30 WBC's suggesting UTI. CT abd/pelvis with several nonobstructing L ureteral stones, although none on R in area of pain. Discussed with urology - recommendation for admission to [facility] for IV abx, NPO at midnight for urology consult/possible intervention in am.     Pt initially unsure about admission, requests additional time to consider - signed out to Bishop Franco PA-C for disposition.     Amount and/or Complexity of Data Reviewed  Labs:  ordered.  Radiology: ordered.    Risk  Prescription drug management.  Decision regarding hospitalization.                 Disposition  Final diagnoses:   UTI (urinary tract infection)   Nephrolithiasis   Ureteral calculi     Time reflects when diagnosis was documented in both MDM as applicable and the Disposition within this note       Time User Action Codes Description Comment    8/27/2024  6:33 PM Joan, Kailen Add [N39.0] UTI (urinary tract infection)     8/27/2024  6:33 PM Joan, Kailen Add [N20.0] Nephrolithiasis     8/27/2024  6:34 PM Joan, Kailen Add [N20.1] Ureteral calculi     8/27/2024  6:34 PM Joan, Kailen Modify [N39.0] UTI (urinary tract infection)     8/27/2024  6:34 PM Joan, Kailen Modify [N20.1] Ureteral calculi           ED Disposition       ED Disposition   Admit    Condition   Stable    Date/Time   Tue Aug 27, 2024 1846    Comment   Case was discussed with JEAN and the patient's admission status was agreed to be Admission Status: inpatient status to the service of Dr. Mejia               Follow-up Information    None         There are no discharge medications for this patient.      No discharge procedures on file.    PDMP Review       None            ED Provider  Electronically Signed by             Gus Burrell PA-C  08/28/24 0716

## 2024-08-28 NOTE — PROGRESS NOTES
"[facility]  Progress Note  Name: Andrew Mcfadden I  MRN: [id number]  Unit/Bed#: OR POOL I Date of Admission: 8/27/2024   Date of Service: 8/28/2024 I Hospital Day: 1    Assessment & Plan   * Kidney stones  Assessment & Plan  Patient presented to the ED with right-sided flank pain; complains of dysuria and urinary hesitancy  Has long history of nephrolithiasis bilaterally; follows with urology in the outpatient setting  CT in the ED revealed the following:  \"7 mm proximal left ureteral calculus and 6 mm distal left ureteral calculus without significant associated hydroureteronephrosis.  Extensive bilateral nephrolithiasis.  Prostatomegaly.\"  Urology consulted, patient to undergo cystoscopy today  Continue IV ceftriaxone  IV fluids, Flomax, Toradol for pain control  Monitor ins and outs; urinary retention protocol    SIRS (systemic inflammatory response syndrome) (HCC)  Assessment & Plan  Patient met SIRS criteria with elevated white count (18.82) and tachycardia  Likely urinary source given dysuria and urinary hesitancy in the setting of nephrolithiasis  However, UA somewhat bland, follow urine culture  Continue IV Rocephin  Monitor for other sources; low threshold to obtain blood cultures if patient becomes febrile    Hypokalemia-resolved as of 8/28/2024  Assessment & Plan  Potassium 3.4 on admission, repleted now within normal limits           VTE Pharmacologic Prophylaxis: VTE Score: 2 Low Risk (Score 0-2) - Encourage Ambulation.    Mobility:   Basic Mobility Inpatient Raw Score: 24  JH-HLM Goal: 8: Walk 250 feet or more  JH-HLM Achieved: 8: Walk 250 feet ot more  JH-HLM Goal achieved. Continue to encourage appropriate mobility.    Patient Centered Rounds: I performed bedside rounds with nursing staff today.   Discussions with Specialists or Other Care Team Provider: Case management    Education and Discussions with Family / Patient: Patient declined call to .     Total Time " Spent on Date of Encounter in care of patient: 30  mins. This time was spent on one or more of the following: performing physical exam; counseling and coordination of care; obtaining or reviewing history; documenting in the medical record; reviewing/ordering tests, medications or procedures; communicating with other healthcare professionals and discussing with patient's family/caregivers.    Current Length of Stay: 1 day(s)  Current Patient Status: Inpatient   Certification Statement: The patient will continue to require additional inpatient hospital stay due to ureteral calculi  Discharge Plan: Anticipate discharge in 24-48 hrs to home.    Code Status: Level 1 - Full Code    Subjective:   Patient was seen laying in bed today.  He reports some improvement in his pain today.  He denies any chest pain, shortness of breath, chills, nausea, vomiting difficulty urinating    Objective:     Vitals:   Temp (24hrs), Av.7 °F (38.2 °C), Min:98.5 °F (36.9 °C), Max:102.1 °F (38.9 °C)    Temp:  [98.5 °F (36.9 °C)-102.1 °F (38.9 °C)] 98.5 °F (36.9 °C)  HR:  [] 89  Resp:  [16-18] 16  BP: (123-162)/(74-95) 123/74  SpO2:  [95 %-100 %] 97 %  There is no height or weight on file to calculate BMI.     Input and Output Summary (last 24 hours):     Intake/Output Summary (Last 24 hours) at 2024 1351  Last data filed at 2024 1344  Gross per 24 hour   Intake 2985 ml   Output --   Net 2985 ml       Physical Exam:   Physical Exam  Vitals and nursing note reviewed.   Constitutional:       Appearance: Normal appearance.   HENT:      Head: Normocephalic and atraumatic.   Eyes:      General: No scleral icterus.  Cardiovascular:      Rate and Rhythm: Normal rate and regular rhythm.   Pulmonary:      Effort: Pulmonary effort is normal.      Breath sounds: Normal breath sounds.   Abdominal:      General: Abdomen is flat. Bowel sounds are normal.      Palpations: Abdomen is soft.   Musculoskeletal:      Right lower leg: No edema.       Left lower leg: No edema.   Skin:     General: Skin is warm and dry.   Neurological:      Mental Status: He is alert. Mental status is at baseline.   Psychiatric:         Mood and Affect: Mood normal.         Behavior: Behavior normal.         Additional Data:     Labs:  Results from last 7 days   Lab Units 08/28/24  0529   WBC Thousand/uL 20.97*   HEMOGLOBIN g/dL 12.5   HEMATOCRIT % 37.7   PLATELETS Thousands/uL 162   SEGS PCT % 84*   LYMPHO PCT % 6*   MONO PCT % 9   EOS PCT % 0     Results from last 7 days   Lab Units 08/28/24  0529   SODIUM mmol/L 132*   POTASSIUM mmol/L 3.6   CHLORIDE mmol/L 104   CO2 mmol/L 22   BUN mg/dL 9   CREATININE mg/dL 0.74   ANION GAP mmol/L 6   CALCIUM mg/dL 8.4   ALBUMIN g/dL 3.7   TOTAL BILIRUBIN mg/dL 2.55*   ALK PHOS U/L 74   ALT U/L 9   AST U/L 12*   GLUCOSE RANDOM mg/dL 125                       Lines/Drains:  Invasive Devices       Peripheral Intravenous Line  Duration             Peripheral IV 08/27/24 Left Antecubital <1 day              Drain  Duration             Ureteral Internal Stent Left ureter <1 day              Airway  Duration             ETT  Cuffed;Oral;Inflated 8 mm <1 day                          Imaging: No pertinent imaging reviewed.    Recent Cultures (last 7 days):         Last 24 Hours Medication List:   Current Facility-Administered Medications   Medication Dose Route Frequency Provider Last Rate    [Transfer Hold] acetaminophen  650 mg Oral Q6H PRN Jeff Bob PA-C      docusate sodium  100 mg Oral BID Conrdao Mejia MD      ketorolac  15 mg Intravenous Q6H PRN Conrado Mejia MD      ketorolac  30 mg Intravenous Q6H PRN Conrado Mejia MD      multi-electrolyte  125 mL/hr Intravenous Continuous Conrado Mejia MD Stopped (08/28/24 1344)    ondansetron  4 mg Intravenous Q6H PRN Conrado Mejia MD      tamsulosin  0.8 mg Oral Daily With Dinner Conrado Mejia MD       Facility-Administered Medications Ordered in Other Encounters   Medication Dose Route Frequency  Provider Last Rate    fentaNYL   Intravenous PRN Dean Ramos, CRNA      lactated ringers   Intravenous Continuous PRN Dean Ramos, CRNA      lidocaine (PF)   Intravenous PRN Dean Ramos, CHRISTO      propofol   Intravenous PRN Dean Ramos, CRNA      Succinylcholine Chloride   Intravenous PRN Dean Ramos, CRNA          Today, Patient Was Seen By: Ashli Mcginnis PA-C    **Please Note: This note may have been constructed using a voice recognition system.**

## 2024-08-28 NOTE — CASE MANAGEMENT
Case Management Discharge Planning Note    Patient name Andrew Mcfadden  Location [facility] M* MRN [id number]  : 1975 Date 2024       Current Admission Date: 2024  Current Admission Diagnosis:Kidney stones   Patient Active Problem List    Diagnosis Date Noted Date Diagnosed    SIRS (systemic inflammatory response syndrome) (HCC) 2024     Hypokalemia 2024     Kidney stones        LOS (days): 1  Geometric Mean LOS (GMLOS) (days):   Days to GMLOS:     OBJECTIVE:  Risk of Unplanned Readmission Score: 5         Current admission status: Inpatient   Preferred Pharmacy: No Pharmacies Listed  Primary Care Provider: No primary care provider on file.    Primary Insurance:   Secondary Insurance:     DISCHARGE DETAILS:                                          Other Referral/Resources/Interventions Provided:  Financial Resources Provided: Financial Counselor, Medicaid (Referral to financial counselors to have [place] medicaid appliction as well as check Search [place] should pt need asst with meds on d/c)

## 2024-08-28 NOTE — UTILIZATION REVIEW
"Initial Clinical Review    Admission: Date/Time/Statement:   Admission Orders (From admission, onward)       Ordered        08/27/24 1847  INPATIENT ADMISSION  Once                          Orders Placed This Encounter   Procedures    INPATIENT ADMISSION     Standing Status:   Standing     Number of Occurrences:   1     Order Specific Question:   Level of Care     Answer:   Med Surg [16]     Order Specific Question:   Estimated length of stay     Answer:   More than 2 Midnights     Order Specific Question:   Certification     Answer:   I certify that inpatient services are medically necessary for this patient for a duration of greater than two midnights. See H&P and MD Progress Notes for additional information about the patient's course of treatment.     ED Arrival Information       Expected   -    Arrival   8/27/2024 13:32    Acuity   Urgent              Means of arrival   Walk-In    Escorted by   Self    Service   Hospitalist    Admission type   Emergency              Arrival complaint   flu like symptoms             Chief Complaint   Patient presents with    Difficulty Urinating     Pt c/o difficulty urinating with generalized whole body aches for the past x 2 days. Pt also c/o fever. Pt last took tylenol at 08:00 PTA. Pt denies cp/sob       Initial Presentation: 48 y.o. male presented to the ER with complaints of right sides flank pain, complains of dysuria and urinary hesitancy. Has long standing history of kidney stones, follows with outpatient urology.  on arrival. PMH: kidney stones. On Exam: pt is positive for abdominal pain, difficulty urinating, flank pain and dysuria. Abnormal labs\" WBC 18.82, Potassium 3.4. CT scan abdomen/pelvis shows 7 mm proximal left ureteral calculus and 6 mm left distal ureteral calculus w/o significant associated hydroureteronephrosis. Extensive bilateral nephrolithiasis. Prostatomegaly. Patient meets with SIRS criteria with elevated WBC and tachycardia, . Urine " cultures pending. IV antibiotics Rocephin given in the ED x 1, IVF's, made NPO and Toradol IV given for pain control. Plan: admit to inpatient, NPO,IVF's, Consult Neurology, Pain control, IV antibiotics. Repeat US bladder in the AM.     8/27  Urology : Continue broad spectrum antibiotics, await culture. Repeat kidney/bladder ultrasound in AM to re-evaluate stone status. NPO at midnight incase surgical intervention is needed. Official consult will be completed by urology team tomorrow, 8/28.     Anticipated Length of Stay/Certification Statement:  Patient will be admitted on an inpatient basis with an anticipated length of stay of greater than 2 midnights secondary to nephrolithiasis     ED Triage Vitals [08/27/24 1337]   Temperature Pulse Respirations Blood Pressure SpO2 Pain Score   98.8 °F (37.1 °C) (!) 109 18 154/88 99 % 9     Weight (last 2 days)       Date/Time Weight    08/27/24 1337 86.4 (190.48)            Vital Signs (last 3 days)       Date/Time Temp Pulse Resp BP MAP (mmHg) SpO2 O2 Device Patient Position - Orthostatic VS Pain    08/28/24 0820 -- -- -- -- -- -- -- -- 8    08/28/24 08:07:24 98.5 °F (36.9 °C) 89 16 123/74 90 97 % -- Lying --    08/28/24 04:39:50 99 °F (37.2 °C) 94 -- -- -- 97 % -- -- --    08/28/24 0316 -- -- -- -- -- -- -- -- 10 - Worst Possible Pain    08/28/24 0313 -- -- -- -- -- -- -- -- 9    08/28/24 02:43:40 102.1 °F (38.9 °C) 109 -- -- -- 97 % -- -- --    08/27/24 23:59:29 100.6 °F (38.1 °C) 99 -- -- -- 97 % -- -- --    08/27/24 2312 101.6 °F (38.7 °C) 97 -- -- -- 97 % -- -- --    08/27/24 2230 -- -- -- -- -- -- -- -- 7    08/27/24 22:14:47 102.1 °F (38.9 °C) 102 -- 141/94 110 95 % -- -- --    08/27/24 2158 -- -- -- -- -- -- -- -- 8 08/27/24 2000 -- 105 18 162/95 123 100 % None (Room air) Lying --    08/27/24 1956 -- 102 -- 158/92 117 99 % None (Room air) Lying 8 08/27/24 1800 -- 108 18 144/92 110 96 % None (Room air) Sitting --    08/27/24 1730 -- 104 18 143/88 109 99 % None  (Room air) Sitting --    08/27/24 1726 -- -- -- -- -- -- -- -- 8    08/27/24 1508 -- -- -- -- -- -- -- -- 9    08/27/24 1404 -- -- -- -- -- -- None (Room air) -- --    08/27/24 1357 -- -- -- -- -- -- -- -- 9    08/27/24 1337 98.8 °F (37.1 °C) 109 18 154/88 113 99 % None (Room air) Sitting 9              Pertinent Labs/Diagnostic Test Results:   Radiology:  US kidney and bladder   Final Interpretation by Rivera Smith MD (08/27 1944)      No evidence for hydronephrosis with multiple intrarenal calculi again noted and seen to better advantage on prior CT performed earlier the same day.      Please note that the calculi along the course of the proximal and distal left ureter should be correlated with earlier CT and are not clearly imaged on the current exam.            Workstation performed: [id number]         CT abdomen pelvis with contrast   Final Interpretation by Bayron Foley MD (08/27 1534)      7 mm proximal left ureteral calculus and 6 mm distal left ureteral calculus without significant associated hydroureteronephrosis.   Extensive bilateral nephrolithiasis.   Prostatomegaly.         Workstation performed: [id number]         US kidney and bladder    (Results Pending)           Results from last 7 days   Lab Units 08/27/24  1726   SARS-COV-2  Negative     Results from last 7 days   Lab Units 08/28/24  0529 08/27/24  1359   WBC Thousand/uL 20.97* 18.82*   HEMOGLOBIN g/dL 12.5 14.0   HEMATOCRIT % 37.7 41.7   PLATELETS Thousands/uL 162 201   TOTAL NEUT ABS Thousands/µL 17.61* 15.97*         Results from last 7 days   Lab Units 08/28/24  0529 08/27/24  1359   SODIUM mmol/L 132* 135   POTASSIUM mmol/L 3.6 3.4*   CHLORIDE mmol/L 104 104   CO2 mmol/L 22 24   ANION GAP mmol/L 6 7   BUN mg/dL 9 11   CREATININE mg/dL 0.74 0.83   EGFR ml/min/1.73sq m 110 105   CALCIUM mg/dL 8.4 8.9   MAGNESIUM mg/dL 1.9  --    PHOSPHORUS mg/dL 2.6*  --      Results from last 7 days   Lab Units 08/28/24  0529   AST U/L 12*   ALT U/L 9   ALK  PHOS U/L 74   TOTAL PROTEIN g/dL 6.9   ALBUMIN g/dL 3.7   TOTAL BILIRUBIN mg/dL 2.55*         Results from last 7 days   Lab Units 08/28/24  0529 08/27/24  1359   GLUCOSE RANDOM mg/dL 125 112                   Results from last 7 days   Lab Units 08/27/24  1549   CLARITY UA  Clear   COLOR UA  Yellow   SPEC GRAV UA  1.010   PH UA  7.0   GLUCOSE UA mg/dl Negative   KETONES UA mg/dl Trace*   BLOOD UA  Trace*   PROTEIN UA mg/dl Negative   NITRITE UA  Negative   BILIRUBIN UA  Negative   UROBILINOGEN UA E.U./dl 0.2   LEUKOCYTES UA  Small*   WBC UA /hpf 20-30*   RBC UA /hpf 4-10*   BACTERIA UA /hpf Occasional   EPITHELIAL CELLS WET PREP /hpf Occasional   MUCUS THREADS  Occasional*     Results from last 7 days   Lab Units 08/27/24  1726   INFLUENZA A PCR  Negative   INFLUENZA B PCR  Negative   RSV PCR  Negative                   ED Treatment-Medication Administration from 08/27/2024 1332 to 08/27/2024 2153         Date/Time Order Dose Route Action     08/27/2024 1357 ketorolac (TORADOL) injection 15 mg 15 mg Intravenous Given     08/27/2024 1435 sodium chloride 0.9 % bolus 1,000 mL 1,000 mL Intravenous New Bag     08/27/2024 1442 iohexol (OMNIPAQUE) 350 MG/ML injection (MULTI-DOSE) 100 mL 100 mL Intravenous Given     08/27/2024 1508 morphine injection 2 mg 2 mg Intravenous Given     08/27/2024 1726 ceftriaxone (ROCEPHIN) 1 g/50 mL in dextrose IVPB 1,000 mg Intravenous New Bag     08/27/2024 1726 morphine injection 4 mg 4 mg Intravenous Given     08/27/2024 2017 potassium chloride (Klor-Con M20) CR tablet 40 mEq 40 mEq Oral Given            Past Medical History:   Diagnosis Date    Kidney stones      Present on Admission:   Kidney stones   SIRS (systemic inflammatory response syndrome) (HCC)   Hypokalemia      Admitting Diagnosis: Nephrolithiasis [N20.0]  Ureteral calculi [N20.1]  UTI (urinary tract infection) [N39.0]  Difficulty urinating [R39.198]  Age/Sex: 48 y.o. male  Admission Orders:  Scheduled  Medications:  cefTRIAXone, 1,000 mg, Intravenous, Once  docusate sodium, 100 mg, Oral, BID  tamsulosin, 0.8 mg, Oral, Daily With Dinner      Continuous IV Infusions:  multi-electrolyte, 125 mL/hr, Intravenous, Continuous      PRN Meds:  acetaminophen, 650 mg, Oral, Q6H PRN  ketorolac, 15 mg, Intravenous, Q6H PRN x 1 dose 8/27 0316  ketorolac, 30 mg, Intravenous, Q6H PRN x 1 doses 8/28 0820  ondansetron, 4 mg, Intravenous, Q6H PRN  Morphine 2m g IV x 1 dose     ORDERS:   NPO  I/O's  US kidney/bladder   Strain urine       IP CONSULT TO UROLOGY    Network Utilization Review Department  ATTENTION: Please call with any questions or concerns to [phone] and carefully listen to the prompts so that you are directed to the right person. All voicemails are confidential.   For Discharge needs, contact Care Management DC Support Team at [phone] opt. 2  Send all requests for admission clinical reviews, approved or denied determinations and any other requests to dedicated fax number below belonging to the campus where the patient is receiving treatment. List of dedicated fax numbers for the Facilities:  FACILITY NAME UR FAX NUMBER   ADMISSION DENIALS (Administrative/Medical Necessity) [phone]   DISCHARGE SUPPORT TEAM (NETWORK) [phone]   PARENT CHILD HEALTH (Maternity/NICU/Pediatrics) [phone]   [facility] [phone]   [facility] [phone]   [facility] [phone]   [facility] [phone]   [facility] [phone]   [facility] [phone]   [facility] [phone]   [facility] [phone]   [facility] [phone]   [facility] [phone]   [facility] [phone]   [facility] [phone]

## 2024-08-28 NOTE — QUICK NOTE
See op report.  Stent in place, discharge when appropriate by medicine on oral antibiotics for 2 weeks.  We will arrange secondary ureteroscopy to laser any other stones in the ureter.  He has several stones in the kidney    Long history of stones, needs nephrology evaluation to hopefully prevent more stones in the future

## 2024-08-28 NOTE — ANESTHESIA PREPROCEDURE EVALUATION
Procedure:  CYSTOSCOPY RETROGRADE PYELOGRAM WITH INSERTION STENT URETERAL (Left: Bladder)    Relevant Problems   ANESTHESIA (within normal limits)      CARDIO (within normal limits)      GI/HEPATIC (within normal limits)      /RENAL   (+) Kidney stones      MUSCULOSKELETAL (within normal limits)      NEURO/PSYCH (within normal limits)      PULMONARY (within normal limits)      Urinary   (+) Ureteral calculi      Other   (+) SIRS (systemic inflammatory response syndrome) (HCC)        Physical Exam    Airway    Mallampati score: II         Dental   No notable dental hx     Cardiovascular  Cardiovascular exam normal    Pulmonary  Pulmonary exam normal Breath sounds clear to auscultation    Other Findings        Anesthesia Plan  ASA Score- 2 Emergent    Anesthesia Type- general with ASA Monitors.         Additional Monitors:     Airway Plan: ETT.           Plan Factors-    Chart reviewed.   Existing labs reviewed. Patient summary reviewed.                  Induction- rapid sequence induction and intravenous.    Postoperative Plan-     Perioperative Resuscitation Plan - Level 1 - Full Code.       Informed Consent- Anesthetic plan and risks discussed with patient.

## 2024-08-28 NOTE — OP NOTE
OPERATIVE REPORT  PATIENT NAME: Andrew Mcfadden    :  1975  MRN: [id number]  Pt Location: [facility] OR ROOM 04    SURGERY DATE: 2024    Surgeons and Role:     * Robin Metz MD - Primary    Preop Diagnosis:  UTI (urinary tract infection) [N39.0]  Ureteral calculi [N20.1]    Post-Op Diagnosis Codes:     * UTI (urinary tract infection) [N39.0]     * Ureteral calculi [N20.1]    Procedure(s):  Left - CYSTOSCOPY RETROGRADE PYELOGRAM WITH INSERTION STENT URETERAL; BLADDER STONE EXTRACTION.  Urethral dilation    Specimen(s):  ID Type Source Tests Collected by Time Destination   A : left ureteral stone Calculus Ureter, Left STONE ANALYSIS Robin Metz MD 2024  1:57 PM        Estimated Blood Loss:   Minimal    Drains:  Ureteral Internal Stent Left ureter 6 Fr. (Active)   Number of days: 0       Anesthesia Type:   General    Operative Indications:  UTI (urinary tract infection) [N39.0]  Ureteral calculi [N20.1]      Operative Findings:  1.  Stone that had likely been in the lower ureter until just recently, was stuck in the penile urethra about 3 cm in from the meatus.  Urethral meatal stenosis required dilation  The stone was flushed back into the bladder and extracted.    2.  Second stone distal left ureter, about 4 cm above bladder.  Stent went by it with a mild amount of resistance.    3.  Stent upper coils in kidney.  2 mL of contrast injected to confirm placement.  Filling defects consistent with the other stones in the collecting system of the kidney.      Complications:   None    Procedure and Technique:  After the patient was placed under adequate general anesthesia, he was prepped and draped using chlorhexidine solution in lithotomy position.  Palpation confirmed the patient's suspicion that a stone was stuck in the urethra about 3 cm in for outside the meatus.  The scope would not pass at the very meatus due to stenosis of the meatus and fossa.  This was dilated to 26.  The scope was then passed, and I  could see the stone in the penile urethra.  It flushed back into the kidney with passage of the scope.  There were no urethral strictures.  The prostate had moderate hyperplasia although it did look much bigger on CT scan.    The bladder had moderate trabeculations, residual about 250 mL.    The left orifice was somewhat edematous.  A wire was placed in the orifice, and went past the stone up into the kidney.  A 6 [place] Contour stent was passed over the wire into the renal pelvis.    2 mL of contrast was injected to confirm placement, but this small amount was instilled to not over distend the collecting system.  I could see  filling defects from the other stones.    The scope was removed, leave the stent in place.  Patient went to recovery in good condition.   I was present for the entire procedure.    Patient Disposition:  PACU         SIGNATURE: Robin Metz MD  DATE: August 28, 2024  TIME: 2:07 PM

## 2024-08-28 NOTE — ASSESSMENT & PLAN NOTE
"Patient presented to the ED with right-sided flank pain; complains of dysuria and urinary hesitancy  Has long history of nephrolithiasis bilaterally; follows with urology in the outpatient setting  CT in the ED revealed the following:  \"7 mm proximal left ureteral calculus and 6 mm distal left ureteral calculus without significant associated hydroureteronephrosis.  Extensive bilateral nephrolithiasis.  Prostatomegaly.\"  Urology consulted, patient to undergo cystoscopy today  Continue IV ceftriaxone  IV fluids, Flomax, Toradol for pain control  Monitor ins and outs; urinary retention protocol  "

## 2024-08-28 NOTE — ANESTHESIA POSTPROCEDURE EVALUATION
Post-Op Assessment Note    CV Status:  Stable  Pain Score: 0    Pain management: adequate       Mental Status:  Arousable   Hydration Status:  Stable   PONV Controlled:  None   Airway Patency:  Patent     Post Op Vitals Reviewed: Yes    No anethesia notable event occurred.    Staff: Anesthesiologist, CRNA               BP   97/58   Temp 98   Pulse 84   Resp 16   SpO2 99

## 2024-08-28 NOTE — ASSESSMENT & PLAN NOTE
Longstanding history of nephrolithiasis  POD #1 s/p cystoscopy left ureteral stent insertion, bladder stones extraction and urethral dilation   +UTI -- will require second stage surgery for definitive stone management   Preop urine culture returned positive for Serratia marcescens  Initiated on ciprofloxacin, will require 2 weeks of oral antibiotics upon discharge  WBC remains elevated at 20.43, continue to monitor closely VSS, he remains afebrile  Patient's pain is overall well-controlled  Diet advanced, denies nausea/vomiting  Urology will reach out to the office to arrange outpatient follow-up, second stage surgery  Urology will sign off at this time but is able for further consultation if needed

## 2024-08-28 NOTE — ASSESSMENT & PLAN NOTE
Patient met SIRS criteria with elevated white count (18.82) and tachycardia  Likely urinary source given dysuria and urinary hesitancy in the setting of nephrolithiasis  However, UA somewhat bland, follow urine culture  Continue IV Rocephin  Monitor for other sources; low threshold to obtain blood cultures if patient becomes febrile

## 2024-08-28 NOTE — CONSULTS
Consult - Urology   Andrew Jadenurielnitin 1975, 48 y.o. male MRN: [id number]    Unit/Bed#: [place] [address] -01 Encounter: [id number]      Ureteral calculi  Assessment & Plan    Left proximal and distal ureteral calculi  Distal urethral calculus  Prostatomegaly  Febrile UTI  Other bilateral renal calculi    Developed fever and chills overnight with Tmax 102 and leukocytosis now 20. He is already receiving ceftriaxone. Despite no significant hydronephrosis he has several potentially obstructing stones in the urethra and the left ureter. Recommend OR today for cystoscopy, urethral dilation/stone extraction, retrograde pyelogram left ureteral stent insertion. He is NPO. He signs informed consent.          Subjective: 48 year old man with twenty year stone history, multiple prior stone passages, only prior surgical intervention left ESWL 2022 in [place]. He started 2 days ago with acute bilateral flank pain, he feels it more in the right lower quadrant and penis now. He can feel a stone inside the tip of the penis. he still has back pain though. he has not passed any stones. no dysuria or hematuria. developed fevers chills and sweats overnight. he is getting IV antibiotics.    Review of Systems   Constitutional:  Positive for chills, diaphoresis, fatigue and fever. Negative for activity change, appetite change and unexpected weight change.   Respiratory: Negative.  Negative for cough and shortness of breath.    Cardiovascular: Negative.  Negative for chest pain.   Genitourinary:  Positive for flank pain, frequency and penile pain. Negative for decreased urine volume, difficulty urinating, dysuria, hematuria, testicular pain and urgency.   Musculoskeletal:  Positive for back pain.       Objective:  Vitals: Blood pressure 123/74, pulse 89, temperature 98.5 °F (36.9 °C), temperature source Oral, resp. rate 16, weight 86.4 kg (190 lb 7.6 oz), SpO2 97%.,There is no height or weight on file to calculate BMI.    Physical  Exam  Vitals and nursing note reviewed.   Constitutional:       Appearance: He is well-developed.   HENT:      Head: Normocephalic and atraumatic.   Cardiovascular:      Rate and Rhythm: Normal rate and regular rhythm.      Heart sounds: Normal heart sounds. No murmur heard.  Pulmonary:      Effort: Pulmonary effort is normal.      Breath sounds: Normal breath sounds.   Abdominal:      General: Bowel sounds are normal. There is no distension.      Palpations: Abdomen is soft.      Tenderness: There is no abdominal tenderness. There is no right CVA tenderness or left CVA tenderness.   Genitourinary:     Comments: uncircumcised penis orthotopic meatus. palpable stone within distal urethra though not visible; testes descended bilaterally, nontender  Musculoskeletal:         General: No edema. Normal range of motion.   Skin:     General: Skin is warm and dry.      Capillary Refill: Capillary refill takes less than 2 seconds.      Coloration: Skin is not pale.   Neurological:      Mental Status: He is alert and oriented to person, place, and time.         Imaging:    CT abdomen pelvis with contrast [[id number]] Collected: 08/27/24 1450   Order Status: Completed Updated: 08/27/24 1536   Narrative:     CT ABDOMEN AND PELVIS WITH IV CONTRAST    INDICATION: R lower back/flank pain. Dysuria/hesitancy. Slight R lower abd pain..    COMPARISON: CT abdomen/pelvis dated 8/26/2006.    TECHNIQUE: CT examination of the abdomen and pelvis was performed. Multiplanar 2D reformatted images were created from the source data.    This examination, like all CT scans performed in the [facility], was performed utilizing techniques to minimize radiation dose exposure, including the use of iterative reconstruction and automated exposure control. Radiation dose length  product (DLP) for this visit: 516 mGy-cm    IV Contrast: 100 mL of iohexol (OMNIPAQUE)  Enteric Contrast: Not administered.    FINDINGS:    ABDOMEN    LOWER CHEST:  No clinically significant abnormality in the visualized lower chest.    LIVER/BILIARY TREE: Unremarkable.    GALLBLADDER: No calcified gallstones. No pericholecystic inflammatory change.    SPLEEN: Unremarkable.    PANCREAS: Unremarkable.    ADRENAL GLANDS: Unremarkable.    KIDNEYS/URETERS: Multiple nonobstructing bilateral intrarenal calculi measuring up to 1 cm on the right. There is a 7 mm calculus in the proximal left ureter as well as a 6 mm calculus in the distal ureter adjacent to the urinary bladder without  significant associated hydroureteronephrosis. Subcentimeter hypoattenuating renal lesion(s), too small to characterize but statistically likely benign, which do not warrant follow-up (Radiology June 2019).    STOMACH AND BOWEL: Unremarkable.    APPENDIX: No findings to suggest appendicitis.    ABDOMINOPELVIC CAVITY: No ascites. No pneumoperitoneum. No lymphadenopathy.    VESSELS: Unremarkable for patient's age.    PELVIS    REPRODUCTIVE ORGANS: The prostate gland is enlarged measuring 7.5 x 6.0 x 5.1 cm.    URINARY BLADDER: Unremarkable.    ABDOMINAL WALL/INGUINAL REGIONS: Unremarkable.    BONES: No acute fracture or suspicious osseous lesion. Multilevel degenerative disc disease of the lumbar spine.   Impression:       7 mm proximal left ureteral calculus and 6 mm distal left ureteral calculus without significant associated hydroureteronephrosis.  Extensive bilateral nephrolithiasis.  Prostatomegaly.     Imaging reviewed - both report and images personally reviewed.     Labs:  Recent Labs     08/27/24  1359 08/28/24  0529   WBC 18.82* 20.97*     Recent Labs     08/27/24  1359 08/28/24  0529   HGB 14.0 12.5       Recent Labs     08/27/24  1359 08/28/24  0529   CREATININE 0.83 0.74       Microbiology:  4-10 rbcs; 20-30 wbcx, occasional bacteria  UCX sent/pending    History:  Social History     Socioeconomic History    Marital status: Single     Spouse name: None    Number of children: None    Years of  education: None    Highest education level: None   Occupational History    None   Tobacco Use    Smoking status: Never    Smokeless tobacco: Never   Vaping Use    Vaping status: Never Used   Substance and Sexual Activity    Alcohol use: Never    Drug use: Never    Sexual activity: None   Other Topics Concern    None   Social History Narrative    None     Social Determinants of Health     Financial Resource Strain: Not on file   Food Insecurity: Not on file   Transportation Needs: Not on file   Physical Activity: Not on file   Stress: Not on file   Social Connections: Not on file   Intimate Partner Violence: Not on file   Housing Stability: Not on file       Past Medical History:   Diagnosis Date    Kidney stones      Past Surgical History:   Procedure Laterality Date    ACHILLES TENDON SURGERY  2010     History reviewed. No pertinent family history.    Nella Morris PA-C  Date: 8/28/2024 Time: 12:36 PM

## 2024-08-28 NOTE — PLAN OF CARE
Problem: PAIN - ADULT  Goal: Verbalizes/displays adequate comfort level or baseline comfort level  Description: Interventions:  - Encourage patient to monitor pain and request assistance  - Assess pain using appropriate pain scale  - Administer analgesics based on type and severity of pain and evaluate response  - Implement non-pharmacological measures as appropriate and evaluate response  - Consider cultural and social influences on pain and pain management  - Notify physician/advanced practitioner if interventions unsuccessful or patient reports new pain  8/28/2024 1032 by Latricia Burns RN  Outcome: Progressing  8/28/2024 1032 by Latricia Burns RN  Outcome: Progressing     Problem: INFECTION - ADULT  Goal: Absence or prevention of progression during hospitalization  Description: INTERVENTIONS:  - Assess and monitor for signs and symptoms of infection  - Monitor lab/diagnostic results  - Monitor all insertion sites, i.e. indwelling lines, tubes, and drains  - Monitor endotracheal if appropriate and nasal secretions for changes in amount and color  - [place] appropriate cooling/warming therapies per order  - Administer medications as ordered  - Instruct and encourage patient and family to use good hand hygiene technique  - Identify and instruct in appropriate isolation precautions for identified infection/condition  8/28/2024 1032 by Latricia Burns RN  Outcome: Progressing  8/28/2024 1032 by Latricia Burns RN  Outcome: Progressing  Goal: Absence of fever/infection during neutropenic period  Description: INTERVENTIONS:  - Monitor WBC    8/28/2024 1032 by Latricia Burns RN  Outcome: Progressing  8/28/2024 1032 by Latricia Burns RN  Outcome: Progressing     Problem: SAFETY ADULT  Goal: Patient will remain free of falls  Description: INTERVENTIONS:  - Educate patient/family on patient safety including physical limitations  - Instruct patient to call for assistance with activity   - Consult OT/PT  to assist with strengthening/mobility   - Keep Call bell within reach  - Keep bed low and locked with side rails adjusted as appropriate  - Keep care items and personal belongings within reach  - Initiate and maintain comfort rounds  - Make Fall Risk Sign visible to staff  - Obtain necessary fall risk management equipment  - Apply yellow socks and bracelet for high fall risk patients  - Consider moving patient to room near nurses station  8/28/2024 1032 by Latricia Burns RN  Outcome: Progressing  8/28/2024 1032 by Latricia Burns RN  Outcome: Progressing  Goal: Maintain or return to baseline ADL function  Description: INTERVENTIONS:  -  Assess patient's ability to carry out ADLs; assess patient's baseline for ADL function and identify physical deficits which impact ability to perform ADLs (bathing, care of mouth/teeth, toileting, grooming, dressing, etc.)  - Assess/evaluate cause of self-care deficits   - Assess range of motion  - Assess patient's mobility; develop plan if impaired  - Assess patient's need for assistive devices and provide as appropriate  - Encourage maximum independence but intervene and supervise when necessary  - Involve family in performance of ADLs  - Assess for home care needs following discharge   - Consider OT consult to assist with ADL evaluation and planning for discharge  - Provide patient education as appropriate  8/28/2024 1032 by Latricia Burns RN  Outcome: Progressing  8/28/2024 1032 by Latricia Burns RN  Outcome: Progressing  Goal: Maintains/Returns to pre admission functional level  Description: INTERVENTIONS:  - Perform AM-PAC 6 Click Basic Mobility/ Daily Activity assessment daily.  - Set and communicate daily mobility goal to care team and patient/family/caregiver.   - Collaborate with rehabilitation services on mobility goals if consulted  - Perform Range of Motion 3 times a day.  - Reposition patient every 2 hours.  - Dangle patient 3 times a day  - Stand patient 3  times a day  - Ambulate patient 3 times a day  - Out of bed to chair 3 times a day   - Out of bed for meals 3 times a day  - Out of bed for toileting  - Record patient progress and toleration of activity level   8/28/2024 1032 by Latricia Burns RN  Outcome: Progressing  8/28/2024 1032 by Latricia Burns RN  Outcome: Progressing     Problem: DISCHARGE PLANNING  Goal: Discharge to home or other facility with appropriate resources  Description: INTERVENTIONS:  - Identify barriers to discharge w/patient and caregiver  - Arrange for needed discharge resources and transportation as appropriate  - Identify discharge learning needs (meds, wound care, etc.)  - Arrange for interpretive services to assist at discharge as needed  - Refer to Case Management Department for coordinating discharge planning if the patient needs post-hospital services based on physician/advanced practitioner order or complex needs related to functional status, cognitive ability, or social support system  8/28/2024 1032 by Latricia Burns RN  Outcome: Progressing  8/28/2024 1032 by Latricia Burns RN  Outcome: Progressing     Problem: GENITOURINARY - ADULT  Goal: Maintains or returns to baseline urinary function  Description: INTERVENTIONS:  - Assess urinary function  - Encourage oral fluids to ensure adequate hydration if ordered  - Administer IV fluids as ordered to ensure adequate hydration  - Administer ordered medications as needed  - Offer frequent toileting  - Follow urinary retention protocol if ordered  8/28/2024 1032 by Latricia Burns RN  Outcome: Progressing  8/28/2024 1032 by Latricia Burns RN  Outcome: Progressing  Goal: Absence of urinary retention  Description: INTERVENTIONS:  - Assess patient’s ability to void and empty bladder  - Monitor I/O  - Bladder scan as needed  - Discuss with physician/AP medications to alleviate retention as needed  - Discuss catheterization for long term situations as appropriate  8/28/2024  1032 by Latricia Bunrs RN  Outcome: Progressing  8/28/2024 1032 by Latricia Burns RN  Outcome: Progressing  Goal: Urinary catheter remains patent  Description: INTERVENTIONS:  - Assess patency of urinary catheter  - If patient has a chronic graff, consider changing catheter if non-functioning  - Follow guidelines for intermittent irrigation of non-functioning urinary catheter  8/28/2024 1032 by Latricia Burns RN  Outcome: Progressing  8/28/2024 1032 by Latricia Burns RN  Outcome: Progressing     Problem: METABOLIC, FLUID AND ELECTROLYTES - ADULT  Goal: Electrolytes maintained within normal limits  Description: INTERVENTIONS:  - Monitor labs and assess patient for signs and symptoms of electrolyte imbalances  - Administer electrolyte replacement as ordered  - Monitor response to electrolyte replacements, including repeat lab results as appropriate  - Instruct patient on fluid and nutrition as appropriate  Outcome: Progressing  Goal: Fluid balance maintained  Description: INTERVENTIONS:  - Monitor labs   - Monitor I/O and WT  - Instruct patient on fluid and nutrition as appropriate  - Assess for signs & symptoms of volume excess or deficit  Outcome: Progressing

## 2024-08-28 NOTE — PLAN OF CARE
Problem: PAIN - ADULT  Goal: Verbalizes/displays adequate comfort level or baseline comfort level  Description: Interventions:  - Encourage patient to monitor pain and request assistance  - Assess pain using appropriate pain scale  - Administer analgesics based on type and severity of pain and evaluate response  - Implement non-pharmacological measures as appropriate and evaluate response  - Consider cultural and social influences on pain and pain management  - Notify physician/advanced practitioner if interventions unsuccessful or patient reports new pain  Outcome: Progressing     Problem: INFECTION - ADULT  Goal: Absence or prevention of progression during hospitalization  Description: INTERVENTIONS:  - Assess and monitor for signs and symptoms of infection  - Monitor lab/diagnostic results  - Monitor all insertion sites, i.e. indwelling lines, tubes, and drains  - Monitor endotracheal if appropriate and nasal secretions for changes in amount and color  - [place] appropriate cooling/warming therapies per order  - Administer medications as ordered  - Instruct and encourage patient and family to use good hand hygiene technique  - Identify and instruct in appropriate isolation precautions for identified infection/condition  Outcome: Progressing  Goal: Absence of fever/infection during neutropenic period  Description: INTERVENTIONS:  - Monitor WBC    Outcome: Progressing     Problem: SAFETY ADULT  Goal: Patient will remain free of falls  Description: INTERVENTIONS:  - Educate patient/family on patient safety including physical limitations  - Instruct patient to call for assistance with activity   - Consult OT/PT to assist with strengthening/mobility   - Keep Call bell within reach  - Keep bed low and locked with side rails adjusted as appropriate  - Keep care items and personal belongings within reach  - Initiate and maintain comfort rounds  - Make Fall Risk Sign visible to staff  - Offer Toileting every  Hours,  in advance of need  - Initiate/Maintain alarm  - Obtain necessary fall risk management equipment:   - Apply yellow socks and bracelet for high fall risk patients  - Consider moving patient to room near nurses station  Outcome: Progressing  Goal: Maintain or return to baseline ADL function  Description: INTERVENTIONS:  -  Assess patient's ability to carry out ADLs; assess patient's baseline for ADL function and identify physical deficits which impact ability to perform ADLs (bathing, care of mouth/teeth, toileting, grooming, dressing, etc.)  - Assess/evaluate cause of self-care deficits   - Assess range of motion  - Assess patient's mobility; develop plan if impaired  - Assess patient's need for assistive devices and provide as appropriate  - Encourage maximum independence but intervene and supervise when necessary  - Involve family in performance of ADLs  - Assess for home care needs following discharge   - Consider OT consult to assist with ADL evaluation and planning for discharge  - Provide patient education as appropriate  Outcome: Progressing  Goal: Maintains/Returns to pre admission functional level  Description: INTERVENTIONS:  - Perform AM-PAC 6 Click Basic Mobility/ Daily Activity assessment daily.  - Set and communicate daily mobility goal to care team and patient/family/caregiver.   - Collaborate with rehabilitation services on mobility goals if consulted  - Perform Range of Motion  times a day.  - Reposition patient every  hours.  - Dangle patient  times a day  - Stand patient  times a day  - Ambulate patient  times a day  - Out of bed to chair  times a day   - Out of bed for meals  times a day  - Out of bed for toileting  - Record patient progress and toleration of activity level   Outcome: Progressing     Problem: DISCHARGE PLANNING  Goal: Discharge to home or other facility with appropriate resources  Description: INTERVENTIONS:  - Identify barriers to discharge w/patient and caregiver  - Arrange for  needed discharge resources and transportation as appropriate  - Identify discharge learning needs (meds, wound care, etc.)  - Arrange for interpretive services to assist at discharge as needed  - Refer to Case Management Department for coordinating discharge planning if the patient needs post-hospital services based on physician/advanced practitioner order or complex needs related to functional status, cognitive ability, or social support system  Outcome: Progressing     Problem: GENITOURINARY - ADULT  Goal: Maintains or returns to baseline urinary function  Description: INTERVENTIONS:  - Assess urinary function  - Encourage oral fluids to ensure adequate hydration if ordered  - Administer IV fluids as ordered to ensure adequate hydration  - Administer ordered medications as needed  - Offer frequent toileting  - Follow urinary retention protocol if ordered  Outcome: Progressing  Goal: Absence of urinary retention  Description: INTERVENTIONS:  - Assess patient’s ability to void and empty bladder  - Monitor I/O  - Bladder scan as needed  - Discuss with physician/AP medications to alleviate retention as needed  - Discuss catheterization for long term situations as appropriate  Outcome: Progressing  Goal: Urinary catheter remains patent  Description: INTERVENTIONS:  - Assess patency of urinary catheter  - If patient has a chronic graff, consider changing catheter if non-functioning  - Follow guidelines for intermittent irrigation of non-functioning urinary catheter  Outcome: Progressing

## 2024-08-28 NOTE — PROGRESS NOTES
"[facility]  Progress Note  Name: Andrew Mcfadden I  MRN: [id number]  Unit/Bed#: ED-10 I Date of Admission: 8/27/2024   Date of Service: 8/27/2024 I Hospital Day: 0    Assessment & Plan   * Kidney stones  Assessment & Plan  Patient presents to the ED with right-sided flank pain; complains of dysuria and urinary hesitancy  Has long history of nephrolithiasis bilaterally; follows with urology in the outpatient setting  CT in the ED revealed the following:  \"7 mm proximal left ureteral calculus and 6 mm distal left ureteral calculus without significant associated hydroureteronephrosis.  Extensive bilateral nephrolithiasis.  Prostatomegaly.\"  Urology consulted, recommend admit for medical expulsion therapy  Also recommend continuing antibiotics and repeat kidney bladder ultrasound in the a.m.  IV fluids, Flomax, Toradol for pain control  N.p.o. at midnight in case cystoscopy is required  Monitor ins and outs; urinary retention protocol    Hypokalemia  Assessment & Plan  Potassium 3.4 on admission, replete and monitor on morning labs    SIRS (systemic inflammatory response syndrome) (HCC)  Assessment & Plan  Patient met SIRS criteria with elevated white count (18.82) and tachycardia  Likely urinary source given dysuria and urinary hesitancy in the setting of nephrolithiasis  However, UA somewhat bland, may have been received after antibiotics initiated in the ED  Nonetheless, will wait for cultures, empiric antibiotics with Rocephin; IV fluids  Monitor for other sources; low threshold to obtain blood cultures if patient becomes febrile             VTE Pharmacologic Prophylaxis: VTE Score: 2 Low Risk (Score 0-2) - Encourage Ambulation.  Code Status: Full  Discussion with family: Care plan discussed with patient who voiced understanding and agrees with recommendations.      Anticipated Length of Stay: Patient will be admitted on an inpatient basis with an anticipated length of stay of greater than " "2 midnights secondary to nephrolithiasis.    Total Time Spent on Date of Encounter in care of patient: 60 mins. This time was spent on one or more of the following: performing physical exam; counseling and coordination of care; obtaining or reviewing history; documenting in the medical record; reviewing/ordering tests, medications or procedures; communicating with other healthcare professionals and discussing with patient's family/caregivers.    Chief Complaint: Right flank pain and dysuria    History of Present Illness:  Andrew Mcfadden is a 48 y.o. male with a PMH of nephrolithiasis who presents with right flank pain along with dysuria and urinary hesitancy.  Patient has long history of bilateral nephrolithiasis and follows with urology in the outpatient setting.  Presents with right flank pain and urinary hesitancy; CT in the ED with the following read: \"7 mm proximal left ureteral calculus and 6 mm distal left ureteral calculus without significant associated hydroureteronephrosis.Extensive bilateral nephrolithiasis.\".  Urology was consulted and recommended admission along with medical expulsive therapy and repeat ultrasound kidney and bladder tomorrow morning.  Patient denies any other acute complaints.    Review of Systems:  Review of Systems   Constitutional:  Negative for activity change, appetite change, chills, diaphoresis and fever.   HENT:  Negative for congestion, drooling, facial swelling, nosebleeds, sore throat and trouble swallowing.    Eyes:  Negative for pain, discharge, itching and visual disturbance.   Respiratory:  Negative for apnea, cough, chest tightness and shortness of breath.    Cardiovascular:  Negative for chest pain, palpitations and leg swelling.   Gastrointestinal:  Positive for abdominal pain. Negative for anal bleeding, blood in stool, constipation, diarrhea, nausea and vomiting.   Endocrine: Negative.    Genitourinary:  Positive for difficulty urinating, dysuria and flank pain. " Negative for frequency and hematuria.   Musculoskeletal:  Negative for arthralgias, back pain and gait problem.   Skin: Negative.    Allergic/Immunologic: Negative.    Neurological:  Negative for dizziness, seizures, syncope, facial asymmetry, weakness and headaches.   Psychiatric/Behavioral:  Negative for agitation, confusion and hallucinations.        Past Medical and Surgical History:   Past Medical History:   Diagnosis Date    Kidney stones        Past Surgical History:   Procedure Laterality Date    ACHILLES TENDON SURGERY  2010       Meds/Allergies:  Prior to Admission medications    Not on File     I have reviewed home medications using recent Epic encounter.    Allergies: No Known Allergies    Social History:  Marital Status: Single   Occupation:   Patient Pre-hospital Living Situation: Home  Patient Pre-hospital Level of Mobility: walks  Patient Pre-hospital Diet Restrictions: None  Substance Use History:   Social History     Substance and Sexual Activity   Alcohol Use Never     Social History     Tobacco Use   Smoking Status Never   Smokeless Tobacco Never     Social History     Substance and Sexual Activity   Drug Use Never       Family History:  History reviewed. No pertinent family history.    Physical Exam:     Vitals:   Blood Pressure: 162/95 (08/27/24 2000)  Pulse: 105 (08/27/24 2000)  Temperature: 98.8 °F (37.1 °C) (08/27/24 1337)  Temp Source: Oral (08/27/24 1337)  Respirations: 18 (08/27/24 2000)  Weight - Scale: 86.4 kg (190 lb 7.6 oz) (08/27/24 1337)  SpO2: 100 % (08/27/24 2000)    Physical Exam  Vitals and nursing note reviewed.   Constitutional:       General: He is not in acute distress.     Appearance: He is well-developed.   HENT:      Head: Normocephalic and atraumatic.   Eyes:      Conjunctiva/sclera: Conjunctivae normal.   Cardiovascular:      Rate and Rhythm: Normal rate.   Pulmonary:      Effort: Pulmonary effort is normal. No respiratory distress.   Abdominal:      Palpations: Abdomen  "is soft.      Tenderness: There is no abdominal tenderness. There is right CVA tenderness.   Musculoskeletal:         General: No swelling.      Cervical back: Neck supple.   Skin:     General: Skin is warm and dry.   Neurological:      Mental Status: He is alert.   Psychiatric:         Mood and Affect: Mood normal.            Additional Data:     Lab Results:  Results from last 7 days   Lab Units 08/27/24  1359   WBC Thousand/uL 18.82*   HEMOGLOBIN g/dL 14.0   HEMATOCRIT % 41.7   PLATELETS Thousands/uL 201   SEGS PCT % 85*   LYMPHO PCT % 7*   MONO PCT % 7   EOS PCT % 0     Results from last 7 days   Lab Units 08/27/24  1359   SODIUM mmol/L 135   POTASSIUM mmol/L 3.4*   CHLORIDE mmol/L 104   CO2 mmol/L 24   BUN mg/dL 11   CREATININE mg/dL 0.83   ANION GAP mmol/L 7   CALCIUM mg/dL 8.9   GLUCOSE RANDOM mg/dL 112             No results found for: \"HGBA1C\"        Lines/Drains:  Invasive Devices       Peripheral Intravenous Line  Duration             Peripheral IV 08/27/24 Left Antecubital <1 day                        Imaging: Reviewed radiology reports from this admission including: abdominal/pelvic CT  US kidney and bladder   Final Result by Rivera Smith MD (08/27 1944)      No evidence for hydronephrosis with multiple intrarenal calculi again noted and seen to better advantage on prior CT performed earlier the same day.      Please note that the calculi along the course of the proximal and distal left ureter should be correlated with earlier CT and are not clearly imaged on the current exam.            Workstation performed: [id number]         CT abdomen pelvis with contrast   Final Result by Bayron Foley MD (08/27 5000)      7 mm proximal left ureteral calculus and 6 mm distal left ureteral calculus without significant associated hydroureteronephrosis.   Extensive bilateral nephrolithiasis.   Prostatomegaly.         Workstation performed: [id number]             EKG and Other Studies Reviewed on Admission:   EKG: No " EKG obtained.    ** Please Note: This note has been constructed using a voice recognition system. **

## 2024-08-29 ENCOUNTER — TELEPHONE (OUTPATIENT)
Dept: FAMILY MEDICINE CLINIC | Facility: CLINIC | Age: 49
End: 2024-08-29

## 2024-08-29 ENCOUNTER — TELEPHONE (OUTPATIENT)
Dept: OTHER | Facility: HOSPITAL | Age: 49
End: 2024-08-29

## 2024-08-29 VITALS
HEART RATE: 80 BPM | HEIGHT: 68 IN | WEIGHT: 191.36 LBS | BODY MASS INDEX: 29 KG/M2 | OXYGEN SATURATION: 94 % | DIASTOLIC BLOOD PRESSURE: 76 MMHG | TEMPERATURE: 98.1 F | SYSTOLIC BLOOD PRESSURE: 124 MMHG | RESPIRATION RATE: 20 BRPM

## 2024-08-29 DIAGNOSIS — N30.00 ACUTE CYSTITIS WITHOUT HEMATURIA: Primary | ICD-10-CM

## 2024-08-29 PROBLEM — A41.9 SEPSIS (HCC): Status: ACTIVE | Noted: 2024-08-29

## 2024-08-29 PROBLEM — N39.0 URINARY TRACT INFECTION: Status: ACTIVE | Noted: 2024-08-29

## 2024-08-29 LAB
ALBUMIN SERPL BCG-MCNC: 3.4 G/DL (ref 3.5–5)
ALP SERPL-CCNC: 63 U/L (ref 34–104)
ALT SERPL W P-5'-P-CCNC: 9 U/L (ref 7–52)
ANION GAP SERPL CALCULATED.3IONS-SCNC: 7 MMOL/L (ref 4–13)
AST SERPL W P-5'-P-CCNC: 12 U/L (ref 13–39)
BACTERIA UR CULT: ABNORMAL
BILIRUB SERPL-MCNC: 0.77 MG/DL (ref 0.2–1)
BUN SERPL-MCNC: 14 MG/DL (ref 5–25)
CALCIUM ALBUM COR SERPL-MCNC: 9 MG/DL (ref 8.3–10.1)
CALCIUM SERPL-MCNC: 8.5 MG/DL (ref 8.4–10.2)
CHLORIDE SERPL-SCNC: 107 MMOL/L (ref 96–108)
CO2 SERPL-SCNC: 21 MMOL/L (ref 21–32)
CREAT SERPL-MCNC: 0.59 MG/DL (ref 0.6–1.3)
ERYTHROCYTE [DISTWIDTH] IN BLOOD BY AUTOMATED COUNT: 13 % (ref 11.6–15.1)
GFR SERPL CREATININE-BSD FRML MDRD: 121 ML/MIN/1.73SQ M
GLUCOSE SERPL-MCNC: 148 MG/DL (ref 65–140)
HCT VFR BLD AUTO: 36.6 % (ref 36.5–49.3)
HGB BLD-MCNC: 12.3 G/DL (ref 12–17)
MCH RBC QN AUTO: 30.1 PG (ref 26.8–34.3)
MCHC RBC AUTO-ENTMCNC: 33.6 G/DL (ref 31.4–37.4)
MCV RBC AUTO: 90 FL (ref 82–98)
PLATELET # BLD AUTO: 162 THOUSANDS/UL (ref 149–390)
PMV BLD AUTO: 9.9 FL (ref 8.9–12.7)
POTASSIUM SERPL-SCNC: 4.1 MMOL/L (ref 3.5–5.3)
PROT SERPL-MCNC: 6.7 G/DL (ref 6.4–8.4)
RBC # BLD AUTO: 4.09 MILLION/UL (ref 3.88–5.62)
SODIUM SERPL-SCNC: 135 MMOL/L (ref 135–147)
WBC # BLD AUTO: 20.43 THOUSAND/UL (ref 4.31–10.16)

## 2024-08-29 PROCEDURE — 99239 HOSP IP/OBS DSCHRG MGMT >30: CPT

## 2024-08-29 PROCEDURE — 85027 COMPLETE CBC AUTOMATED: CPT | Performed by: UROLOGY

## 2024-08-29 PROCEDURE — 99232 SBSQ HOSP IP/OBS MODERATE 35: CPT | Performed by: UROLOGY

## 2024-08-29 PROCEDURE — 80053 COMPREHEN METABOLIC PANEL: CPT | Performed by: UROLOGY

## 2024-08-29 RX ORDER — OXYCODONE HYDROCHLORIDE 5 MG/1
5 TABLET ORAL EVERY 8 HOURS PRN
Qty: 30 TABLET | Refills: 0 | Status: SHIPPED | OUTPATIENT
Start: 2024-08-29 | End: 2024-09-08

## 2024-08-29 RX ORDER — OXYBUTYNIN CHLORIDE 5 MG/1
5 TABLET, EXTENDED RELEASE ORAL DAILY PRN
Qty: 14 TABLET | Refills: 0 | Status: SHIPPED | OUTPATIENT
Start: 2024-08-29

## 2024-08-29 RX ORDER — IBUPROFEN 400 MG/1
400 TABLET, FILM COATED ORAL EVERY 6 HOURS PRN
Start: 2024-08-29

## 2024-08-29 RX ORDER — ACETAMINOPHEN 325 MG/1
650 TABLET ORAL EVERY 6 HOURS SCHEDULED
Start: 2024-08-29

## 2024-08-29 RX ORDER — SENNOSIDES A AND B 8.6 MG/1
1 TABLET, FILM COATED ORAL
Qty: 30 TABLET | Refills: 0 | Status: SHIPPED | OUTPATIENT
Start: 2024-08-29

## 2024-08-29 RX ORDER — CEFPODOXIME PROXETIL 200 MG/1
200 TABLET, FILM COATED ORAL 2 TIMES DAILY
Qty: 28 TABLET | Refills: 0 | Status: SHIPPED | OUTPATIENT
Start: 2024-08-29 | End: 2024-08-30 | Stop reason: ALTCHOICE

## 2024-08-29 RX ORDER — TAMSULOSIN HYDROCHLORIDE 0.4 MG/1
0.4 CAPSULE ORAL
Qty: 30 CAPSULE | Refills: 0 | Status: SHIPPED | OUTPATIENT
Start: 2024-08-29

## 2024-08-29 RX ADMIN — DOCUSATE SODIUM 100 MG: 100 CAPSULE, LIQUID FILLED ORAL at 18:02

## 2024-08-29 RX ADMIN — CIPROFLOXACIN 400 MG: 400 INJECTION, SOLUTION INTRAVENOUS at 13:06

## 2024-08-29 RX ADMIN — SODIUM CHLORIDE 125 ML/HR: 0.9 INJECTION, SOLUTION INTRAVENOUS at 08:05

## 2024-08-29 RX ADMIN — CIPROFLOXACIN 400 MG: 400 INJECTION, SOLUTION INTRAVENOUS at 00:32

## 2024-08-29 RX ADMIN — ACETAMINOPHEN 650 MG: 325 TABLET ORAL at 05:47

## 2024-08-29 RX ADMIN — TAMSULOSIN HYDROCHLORIDE 0.8 MG: 0.4 CAPSULE ORAL at 18:02

## 2024-08-29 RX ADMIN — ACETAMINOPHEN 650 MG: 325 TABLET ORAL at 18:02

## 2024-08-29 RX ADMIN — ACETAMINOPHEN 650 MG: 325 TABLET ORAL at 13:05

## 2024-08-29 RX ADMIN — DOCUSATE SODIUM 100 MG: 100 CAPSULE, LIQUID FILLED ORAL at 08:50

## 2024-08-29 NOTE — DISCHARGE SUMMARY
[facility]  Discharge- Andrew Mcfadden 1975, 48 y.o. male MRN: [id number]  Unit/Bed#: [place] [address] -01 Encounter: [id number]  Primary Care Provider: No primary care provider on file.   Date and time admitted to hospital: 8/27/2024  1:40 PM    * Ureteral calculi  Assessment & Plan  Patient presented to the ED with right-sided flank pain, dysuria and urinary hesitancy  Patient has a history of nephrolithiasis bilaterally, follows urology in the outpatient setting  CT in the ED revealed 7 mm proximal left ureteral calculus and 6 mm distal left ureteral calculus without significant associated hydroureteronephrosis.  Extensive bilateral nephrolithiasis and prostatomegaly  Patient is status post left cystoscopy retrograde pyelogram with ureteral stent placement and bladder stone extraction by Dr. Metz on 8/28/2024  Patient is voiding and tolerating diet postoperatively.  Patient will require second stage surgery and need to be followed up in the outpatient setting  Kidney stone sent for analysis  Continue oxybutynin daily needed for bladder spasms, Flomax daily  Encourage liberal hydration with water, around-the-clock pain management with NSAIDs and Tylenol.  Discussed taking NSAIDs with food.     Sepsis (HCC)  Assessment & Plan  Secondary to leukocytosis and tachycardia with urinary source.  Patient was also febrile earlier during hospitalization and urine cultures growing Serratia  Suspect ongoing leukocytosis reactionary due to operation, patient is been afebrile over 24 hours and his pain has significantly improved  Will continue antibiotics with cefpodoxime, plan for 2-week course.  Discussed with patient as well as urology Serratia can be a tricky bug and is not always susceptible to cephalosporins.  Given patient's history of Achilles tendon rupture would defer fluoroquinolones at this point.  Blood cultures were not obtained.  Patient is aware that if his pain worsens or he  develops fevers he needs to return to the emergency room.    Urinary tract infection  Assessment & Plan  Urine culture is growing Serratia marcescens  Suspect there is some reactionary component due to ureteral stones  Home with cefpodoxime, complete 2-week course  Urology be following closely outpatient and tailor antibiotics to cultures      Medical Problems       Resolved Problems  Date Reviewed: 8/29/2024            Resolved    Hypokalemia 8/28/2024     Resolved by  Ashli Mcginnis PA-C        Discharging Physician / Practitioner: Levi Lee PA-C  PCP: No primary care provider on file.  Admission Date:   Admission Orders (From admission, onward)       Ordered        08/27/24 1847  INPATIENT ADMISSION  Once                          Discharge Date: 08/29/24    Consultations During Hospital Stay:  Urology    Procedures Performed:   Left cystoscopy retrograde pyelogram with ureteral stent placement and bladder stone extraction by Dr. Metz on 8/28/2024    Significant Findings / Test Results:   FL retrograde pyelogram  Result Date: 8/28/2024  Impression: Fluoroscopic guidance provided for left retrograde pyelogram. Please see separate procedure report for additional details.     US kidney and bladder  Result Date: 8/27/2024  Impression: No evidence for hydronephrosis with multiple intrarenal calculi again noted and seen to better advantage on prior CT performed earlier the same day. Please note that the calculi along the course of the proximal and distal left ureter should be correlated with earlier CT and are not clearly imaged on the current exam.     CT abdomen pelvis with contrast  Result Date: 8/27/2024  Impression: 7 mm proximal left ureteral calculus and 6 mm distal left ureteral calculus without significant associated hydroureteronephrosis. Extensive bilateral nephrolithiasis. Prostatomegaly.     Test Results Pending at Discharge (will require follow up):   Stone analysis  Urine cultures      Outpatient Tests Requested:  Suggest repeating CBC with differential  Outpatient follow-up with urology  Establishment of care PCP  Further workup for ongoing kidney stones    Reason for Admission: Ureteral stone    Hospital Course:   Andrew Mcfadden is a 48 y.o. male patient who originally presented to the hospital on 8/27/2024 due to flank pain, dysuria and urinary hesitancy.  Patient was found to have a left ureteral stone, leukocytosis and tachycardia.  Patient met SIRS criteria and was started antibiotics due to concerns of infection and was seen in consultation by urology.  Patient underwent left cystoscopy with retrograde pyelogram and left ureteral stent placement with bladder stone extraction by Dr. Metz on 8/28/2024.  Patient was given IV fluids and pain medications perioperatively with improvement in his pain.  Prior to day of discharge patient met septic criteria with positive urine cultures growing Serratia marcescens.  Patient's IV Rocephin was transitioned over to IV ciprofloxacin.  On day of discharge patient was feeling well, tolerating a diet without fever, nausea or vomiting.  His pain has improved.  Patient's kidney stone was sent for analysis and he will require follow-up in the outpatient setting with urology for a second stage procedure.  He was encouraged to continue liberal hydration as well as pain medications and spasm medications as needed.  He will continue Flomax daily.  Patient has not seen a PCP in some time and was encouraged to do so, referrals were sent on discharge.  Patient will be discharged with oral cefpodoxime to cover for his Serratia in urine.  Patient and urology were educated that Serratia can be difficult with cephalosporins however will defer fluoroquinolones at this time due to history of tendon rupture.  Patient was educated on red flag symptoms where he needs to return to the emergency room.    Please see above list of diagnoses and related plan for additional  "information.     Condition at Discharge: good    Discharge Day Visit / Exam:   Subjective: Patient seen and examined sitting up and eating lunch.  He reports he feels well.  The pain in his side is almost gone.  He is peeing some pink-colored urine but no paula red blood.  He denies any nausea or vomiting.     Vitals: Blood Pressure: 128/86 (08/29/24 0732)  Pulse: 86 (08/29/24 1204)  Temperature: 98.2 °F (36.8 °C) (08/29/24 1204)  Temp Source: Oral (08/29/24 0732)  Respirations: 20 (08/29/24 0732)  Height: 5' 8\" (172.7 cm) (08/29/24 0851)  Weight - Scale: 86.8 kg (191 lb 5.8 oz) (08/29/24 0900)  SpO2: 96 % (08/29/24 1204)    Exam:   Physical Exam  Vitals and nursing note reviewed.   Constitutional:       General: He is not in acute distress.     Appearance: He is well-developed. He is not ill-appearing, toxic-appearing or diaphoretic.   Cardiovascular:      Rate and Rhythm: Normal rate and regular rhythm.   Pulmonary:      Effort: Pulmonary effort is normal. No respiratory distress.      Breath sounds: Normal breath sounds.   Abdominal:      General: Bowel sounds are normal. There is no distension.      Palpations: Abdomen is soft.      Tenderness: There is no abdominal tenderness.   Musculoskeletal:      Right lower leg: No edema.      Left lower leg: No edema.   Skin:     General: Skin is warm and dry.   Neurological:      Mental Status: He is alert and oriented to person, place, and time.   Psychiatric:         Mood and Affect: Mood normal.         Behavior: Behavior normal.            Discharge instructions/Information to patient and family:   See after visit summary for information provided to patient and family.      Provisions for Follow-Up Care:  See after visit summary for information related to follow-up care and any pertinent home health orders.      Mobility at time of Discharge:   Basic Mobility Inpatient Raw Score: 24  JH-HLM Goal: 8: Walk 250 feet or more  JH-HLM Achieved: 7: Walk 25 feet or more  HLM " Goal achieved. Continue to encourage appropriate mobility.     Disposition:   Home    Planned Readmission: No     Discharge Statement:  I spent 60 minutes discharging the patient. This time was spent on the day of discharge. I had direct contact with the patient on the day of discharge. Greater than 50% of the total time was spent examining patient, answering all patient questions, arranging and discussing plan of care with patient as well as directly providing post-discharge instructions.  Additional time then spent on discharge activities.    Discharge Medications:  See after visit summary for reconciled discharge medications provided to patient and/or family.      **Please Note: This note may have been constructed using a voice recognition system**

## 2024-08-29 NOTE — PROGRESS NOTES
Progress Note - Urology  Andrew Mcfadden 1975, 48 y.o. male MRN: [id number]    Unit/Bed#: [address] 218-01 Encounter: [id number]      Ureteral calculi  Assessment & Plan  Longstanding history of nephrolithiasis  POD #1 s/p cystoscopy left ureteral stent insertion, bladder stones extraction and urethral dilation   +UTI -- will require second stage surgery for definitive stone management   Preop urine culture returned positive for Serratia marcescens  Initiated on ciprofloxacin, will require 2 weeks of oral antibiotics upon discharge  WBC remains elevated at 20.43, continue to monitor closely VSS, he remains afebrile  Patient's pain is overall well-controlled  Diet advanced, denies nausea/vomiting  Urology will reach out to the office to arrange outpatient follow-up, second stage surgery  Urology will sign off at this time but is able for further consultation if needed      Subjective:   POD #1 s/p cystoscopy, left ureteral stent insertion, bladder stone extraction, urethral dilation by Dr. Metz.  Intraoperatively stone was stuck in the penile urethra, patient required he urethral dilation in order to properly remove the stone.  Stones were not lasered at this time due to positive urine studies.  Patient will require second stage stone procedure for definitive management.  Patient's urine culture returned positive for Serratia marcescens.  He received 1 dose of IV ceftriaxone in the ED.  WBC remains elevated at 20.43.  Internal medicine initiated the patient on IV ciprofloxacin.  Patient will require 2 weeks of oral antibiotics at discharge.  Today patient reports feeling overall well, he is eager to go home.  Pain is well-controlled.  Diet advanced, denies nausea/vomiting.  He is ambulating independently, denies dizziness, chest pain, shortness of breath.  Urology will set up outpatient follow-up and sign off at this time.    Urology will sign off but remain available for any further inpatient needs. Please  "feel free to contact the provider currently covering the Urology Epic Chat role for this campus with questions or concerns.    Review of Systems   Constitutional:  Negative for activity change, chills, fatigue and fever.   Respiratory:  Negative for apnea, cough and shortness of breath.    Cardiovascular:  Negative for chest pain and leg swelling.   Gastrointestinal:  Negative for abdominal distention, abdominal pain, constipation, diarrhea, nausea and vomiting.   Genitourinary:  Negative for decreased urine volume, difficulty urinating, dysuria, flank pain, frequency, hematuria, penile pain, testicular pain and urgency.   Neurological:  Negative for dizziness and headaches.   Psychiatric/Behavioral: Negative.           08/27/2024 1549 08/28/2024 2119 Urine culture [[id number]]   (Abnormal)   Urine, Clean Catch    Preliminary result [facility]  Component Value   Urine Culture 60,000-69,000 cfu/ml Serratia marcescens Abnormal  P        Objective:  Vitals: Blood pressure 128/86, pulse 73, temperature 97.7 °F (36.5 °C), temperature source Oral, resp. rate 20, height 5' 8\" (1.727 m), weight 86.4 kg (190 lb 7.6 oz), SpO2 95%.,Body mass index is 28.96 kg/m².    Intake/Output Summary (Last 24 hours) at 8/29/2024 0923  Last data filed at 8/29/2024 0547  Gross per 24 hour   Intake 3725 ml   Output 550 ml   Net 3175 ml     Invasive Devices       Peripheral Intravenous Line  Duration             Peripheral IV 08/27/24 Left Antecubital 1 day              Drain  Duration             Ureteral Internal Stent Left ureter 6 Fr. <1 day                    Physical Exam  Vitals and nursing note reviewed.   Constitutional:       Appearance: Normal appearance.   HENT:      Head: Normocephalic and atraumatic.      Mouth/Throat:      Pharynx: Oropharynx is clear.   Eyes:      Extraocular Movements: Extraocular movements intact.      Conjunctiva/sclera: Conjunctivae normal.   Cardiovascular:      Rate and Rhythm: " Normal rate.   Pulmonary:      Effort: Pulmonary effort is normal.   Abdominal:      General: Abdomen is flat. There is no distension.      Palpations: Abdomen is soft.      Tenderness: There is no abdominal tenderness. There is no right CVA tenderness or left CVA tenderness.   Musculoskeletal:         General: Normal range of motion.      Cervical back: Normal range of motion.   Skin:     General: Skin is warm and dry.   Neurological:      General: No focal deficit present.      Mental Status: He is alert and oriented to person, place, and time.   Psychiatric:         Mood and Affect: Mood normal.         Behavior: Behavior normal.         Labs:  Recent Labs     08/27/24  1359 08/28/24  0529 08/29/24  0504   WBC 18.82* 20.97* 20.43*     Recent Labs     08/27/24  1359 08/28/24  0529 08/29/24  0504   HGB 14.0 12.5 12.3       Recent Labs     08/27/24  1359 08/28/24  0529 08/29/24  0504   CREATININE 0.83 0.74 0.59*       History:    Past Medical History:   Diagnosis Date    Kidney stones      Past Surgical History:   Procedure Laterality Date    ACHILLES TENDON SURGERY  2010    FL RETROGRADE PYELOGRAM  8/28/2024    CA CYSTO BLADDER W/URETERAL CATHETERIZATION Left 8/28/2024    Procedure: CYSTOSCOPY RETROGRADE PYELOGRAM WITH INSERTION STENT URETERAL; BLADDER STONE EXTRACTION;  Surgeon: Robin Mezt MD;  Location: [facility];  Service: Urology     History reviewed. No pertinent family history.  Social History     Socioeconomic History    Marital status: Single     Spouse name: None    Number of children: None    Years of education: None    Highest education level: None   Occupational History    None   Tobacco Use    Smoking status: Never    Smokeless tobacco: Never   Vaping Use    Vaping status: Never Used   Substance and Sexual Activity    Alcohol use: Never    Drug use: Never    Sexual activity: None   Other Topics Concern    None   Social History Narrative    None     Social Determinants of Health     Financial  Resource Strain: Not on file   Food Insecurity: Not on file   Transportation Needs: Not on file   Physical Activity: Not on file   Stress: Not on file   Social Connections: Not on file   Intimate Partner Violence: Not on file   Housing Stability: Not on file         Corine Wagner PA-C  Date: 8/29/2024 Time: 9:23 AM

## 2024-08-29 NOTE — QUICK NOTE
Notified by RN of +urine culture.  Patient requesting antibiotics    Met sepsis criteria on admission with tachycardia and leukocytosis.  Patient reporting systemic symptoms: Fever, fatigue, myalgias, back/flank pain and urinary symptoms  Urine microscopy WBC 20-30, small leuks, - nitrites. Ucx Serratia marcescens  Received 1 dose of IV ceftriaxone in ED  Will start on IV ciprofloxacin

## 2024-08-29 NOTE — H&P
[facility]  H&P  Name: Andrew Mcfadden I  MRN: [id number]  Unit/Bed#: [place] [address] -01 I Date of Admission: 8/27/2024   Date of Service: 8/29/2024 I Hospital Day: 2    Assessment & Plan   Sepsis (HCC)  Assessment & Plan  Secondary to leukocytosis and tachycardia with urinary source.  Patient was also febrile earlier during hospitalization and urine cultures growing Serratia  Suspect ongoing leukocytosis reactionary due to operation, patient is been afebrile over 24 hours and his pain has significantly improved  Continue antibiotics on discharge for 2-week course, follow urine cultures.  Blood cultures were not obtained.  Patient is aware that if his pain worsens or he develops fevers he needs to return to the emergency room.    Urinary tract infection  Assessment & Plan  Urine culture is growing Serratia marcescens  Suspect there is some reactionary component due to ureteral stones  Patient has been a    * Ureteral calculi  Assessment & Plan  Patient presented to the ED with right-sided flank pain, dysuria and urinary hesitancy  Patient has a history of nephrolithiasis bilaterally, follows urology in the outpatient setting  CT in the ED revealed 7 mm proximal left ureteral calculus and 6 mm distal left ureteral calculus without significant associated hydroureteronephrosis.  Extensive bilateral nephrolithiasis and prostatomegaly  Patient is status post left cystoscopy retrograde pyelogram with ureteral stent placement and bladder stone extraction by Dr. Metz on 8/28/2024  Patient is voiding and tolerating diet postoperatively.  Patient will require second stage surgery and need to be followed up in the outpatient setting  Kidney stone sent for analysis  Continue oxybutynin daily needed for bladder spasms, Flomax daily  Encourage liberal hydration with water, around-the-clock pain management with NSAIDs and Tylenol.  Discussed taking NSAIDs with food.              VTE Pharmacologic  "Prophylaxis: VTE Score: 2 Low Risk (Score 0-2) - Encourage Ambulation.  Code Status: Full  Discussion with family: Care plan discussed with patient who voiced understanding and agrees with recommendations.      Anticipated Length of Stay: Patient will be admitted on an inpatient basis with an anticipated length of stay of greater than 2 midnights secondary to nephrolithiasis.    Total Time Spent on Date of Encounter in care of patient: 60 mins. This time was spent on one or more of the following: performing physical exam; counseling and coordination of care; obtaining or reviewing history; documenting in the medical record; reviewing/ordering tests, medications or procedures; communicating with other healthcare professionals and discussing with patient's family/caregivers.    Chief Complaint: Right flank pain and dysuria    History of Present Illness:  Andrew Mcfadden is a 48 y.o. male with a PMH of nephrolithiasis who presents with right flank pain along with dysuria and urinary hesitancy.  Patient has long history of bilateral nephrolithiasis and follows with urology in the outpatient setting.  Presents with right flank pain and urinary hesitancy; CT in the ED with the following read: \"7 mm proximal left ureteral calculus and 6 mm distal left ureteral calculus without significant associated hydroureteronephrosis.Extensive bilateral nephrolithiasis.\".  Urology was consulted and recommended admission along with medical expulsive therapy and repeat ultrasound kidney and bladder tomorrow morning.  Patient denies any other acute complaints.    Review of Systems:  Review of Systems   Constitutional:  Negative for activity change, appetite change, chills, diaphoresis and fever.   HENT:  Negative for congestion, drooling, facial swelling, nosebleeds, sore throat and trouble swallowing.    Eyes:  Negative for pain, discharge, itching and visual disturbance.   Respiratory:  Negative for apnea, cough, chest tightness and " shortness of breath.    Cardiovascular:  Negative for chest pain, palpitations and leg swelling.   Gastrointestinal:  Positive for abdominal pain. Negative for anal bleeding, blood in stool, constipation, diarrhea, nausea and vomiting.   Endocrine: Negative.    Genitourinary:  Positive for difficulty urinating, dysuria and flank pain. Negative for frequency and hematuria.   Musculoskeletal:  Negative for arthralgias, back pain and gait problem.   Skin: Negative.    Allergic/Immunologic: Negative.    Neurological:  Negative for dizziness, seizures, syncope, facial asymmetry, weakness and headaches.   Psychiatric/Behavioral:  Negative for agitation, confusion and hallucinations.        Past Medical and Surgical History:   Past Medical History:   Diagnosis Date    Kidney stones        Past Surgical History:   Procedure Laterality Date    ACHILLES TENDON SURGERY  2010    FL RETROGRADE PYELOGRAM  8/28/2024    AK CYSTO BLADDER W/URETERAL CATHETERIZATION Left 8/28/2024    Procedure: CYSTOSCOPY RETROGRADE PYELOGRAM WITH INSERTION STENT URETERAL; BLADDER STONE EXTRACTION;  Surgeon: Robin Metz MD;  Location: [facility];  Service: Urology       Meds/Allergies:  Prior to Admission medications    Not on File     I have reviewed home medications using recent Epic encounter.    Allergies: No Known Allergies    Social History:  Marital Status: Single   Occupation:   Patient Pre-hospital Living Situation: Home  Patient Pre-hospital Level of Mobility: walks  Patient Pre-hospital Diet Restrictions: None  Substance Use History:   Social History     Substance and Sexual Activity   Alcohol Use Never     Social History     Tobacco Use   Smoking Status Never   Smokeless Tobacco Never     Social History     Substance and Sexual Activity   Drug Use Never       Family History:  History reviewed. No pertinent family history.    Physical Exam:     Vitals:   Blood Pressure: 128/86 (08/29/24 0732)  Pulse: 86 (08/29/24 1204)  Temperature: 98.2  "°F (36.8 °C) (08/29/24 1204)  Temp Source: Oral (08/29/24 0732)  Respirations: 20 (08/29/24 0732)  Height: 5' 8\" (172.7 cm) (08/29/24 0851)  Weight - Scale: 86.8 kg (191 lb 5.8 oz) (08/29/24 0900)  SpO2: 96 % (08/29/24 1204)    Physical Exam  Vitals and nursing note reviewed.   Constitutional:       General: He is not in acute distress.     Appearance: He is well-developed.   HENT:      Head: Normocephalic and atraumatic.   Eyes:      Conjunctiva/sclera: Conjunctivae normal.   Cardiovascular:      Rate and Rhythm: Normal rate.   Pulmonary:      Effort: Pulmonary effort is normal. No respiratory distress.   Abdominal:      Palpations: Abdomen is soft.      Tenderness: There is no abdominal tenderness. There is right CVA tenderness.   Musculoskeletal:         General: No swelling.      Cervical back: Neck supple.   Skin:     General: Skin is warm and dry.   Neurological:      Mental Status: He is alert.   Psychiatric:         Mood and Affect: Mood normal.            Additional Data:     Lab Results:  Results from last 7 days   Lab Units 08/29/24  0504 08/28/24  0529   WBC Thousand/uL 20.43* 20.97*   HEMOGLOBIN g/dL 12.3 12.5   HEMATOCRIT % 36.6 37.7   PLATELETS Thousands/uL 162 162   SEGS PCT %  --  84*   LYMPHO PCT %  --  6*   MONO PCT %  --  9   EOS PCT %  --  0     Results from last 7 days   Lab Units 08/29/24  0504   SODIUM mmol/L 135   POTASSIUM mmol/L 4.1   CHLORIDE mmol/L 107   CO2 mmol/L 21   BUN mg/dL 14   CREATININE mg/dL 0.59*   ANION GAP mmol/L 7   CALCIUM mg/dL 8.5   ALBUMIN g/dL 3.4*   TOTAL BILIRUBIN mg/dL 0.77   ALK PHOS U/L 63   ALT U/L 9   AST U/L 12*   GLUCOSE RANDOM mg/dL 148*             No results found for: \"HGBA1C\"        Lines/Drains:  Invasive Devices       Peripheral Intravenous Line  Duration             Peripheral IV 08/27/24 Left Antecubital 1 day              Drain  Duration             Ureteral Internal Stent Left ureter 6 Fr. <1 day                        Imaging: Reviewed radiology " reports from this admission including: abdominal/pelvic CT  FL retrograde pyelogram   Final Result by Chris Gregorio MD (08/28 1635)      Fluoroscopic guidance provided for left retrograde pyelogram.      Please see separate procedure report for additional details.         Workstation performed: [id number]         US kidney and bladder   Final Result by Rivera Smith MD (08/27 1944)      No evidence for hydronephrosis with multiple intrarenal calculi again noted and seen to better advantage on prior CT performed earlier the same day.      Please note that the calculi along the course of the proximal and distal left ureter should be correlated with earlier CT and are not clearly imaged on the current exam.            Workstation performed: [id number]         CT abdomen pelvis with contrast   Final Result by Bayron Foley MD (08/27 1534)      7 mm proximal left ureteral calculus and 6 mm distal left ureteral calculus without significant associated hydroureteronephrosis.   Extensive bilateral nephrolithiasis.   Prostatomegaly.         Workstation performed: [id number]             EKG and Other Studies Reviewed on Admission:   EKG: No EKG obtained.    ** Please Note: This note has been constructed using a voice recognition system. **

## 2024-08-29 NOTE — ASSESSMENT & PLAN NOTE
Patient presented to the ED with right-sided flank pain, dysuria and urinary hesitancy  Patient has a history of nephrolithiasis bilaterally, follows urology in the outpatient setting  CT in the ED revealed 7 mm proximal left ureteral calculus and 6 mm distal left ureteral calculus without significant associated hydroureteronephrosis.  Extensive bilateral nephrolithiasis and prostatomegaly  Patient is status post left cystoscopy retrograde pyelogram with ureteral stent placement and bladder stone extraction by Dr. Metz on 8/28/2024  Patient is voiding and tolerating diet postoperatively.  Patient will require second stage surgery and need to be followed up in the outpatient setting  Kidney stone sent for analysis  Continue oxybutynin daily needed for bladder spasms, Flomax daily  Encourage liberal hydration with water, around-the-clock pain management with NSAIDs and Tylenol.  Discussed taking NSAIDs with food.

## 2024-08-29 NOTE — PLAN OF CARE
Problem: PAIN - ADULT  Goal: Verbalizes/displays adequate comfort level or baseline comfort level  Description: Interventions:  - Encourage patient to monitor pain and request assistance  - Assess pain using appropriate pain scale  - Administer analgesics based on type and severity of pain and evaluate response  - Implement non-pharmacological measures as appropriate and evaluate response  - Consider cultural and social influences on pain and pain management  - Notify physician/advanced practitioner if interventions unsuccessful or patient reports new pain  Outcome: Progressing     Problem: INFECTION - ADULT  Goal: Absence or prevention of progression during hospitalization  Description: INTERVENTIONS:  - Assess and monitor for signs and symptoms of infection  - Monitor lab/diagnostic results  - Monitor all insertion sites, i.e. indwelling lines, tubes, and drains  - Monitor endotracheal if appropriate and nasal secretions for changes in amount and color  - [place] appropriate cooling/warming therapies per order  - Administer medications as ordered  - Instruct and encourage patient and family to use good hand hygiene technique  - Identify and instruct in appropriate isolation precautions for identified infection/condition  Outcome: Progressing  Goal: Absence of fever/infection during neutropenic period  Description: INTERVENTIONS:  - Monitor WBC    Outcome: Progressing     Problem: SAFETY ADULT  Goal: Patient will remain free of falls  Description: INTERVENTIONS:  - Educate patient/family on patient safety including physical limitations  - Instruct patient to call for assistance with activity   - Consult OT/PT to assist with strengthening/mobility   - Keep Call bell within reach  - Keep bed low and locked with side rails adjusted as appropriate  - Keep care items and personal belongings within reach  - Initiate and maintain comfort rounds  - Make Fall Risk Sign visible to staff  - Apply yellow socks and bracelet  for high fall risk patients  - Consider moving patient to room near nurses station  Outcome: Progressing  Goal: Maintain or return to baseline ADL function  Description: INTERVENTIONS:  -  Assess patient's ability to carry out ADLs; assess patient's baseline for ADL function and identify physical deficits which impact ability to perform ADLs (bathing, care of mouth/teeth, toileting, grooming, dressing, etc.)  - Assess/evaluate cause of self-care deficits   - Assess range of motion  - Assess patient's mobility; develop plan if impaired  - Assess patient's need for assistive devices and provide as appropriate  - Encourage maximum independence but intervene and supervise when necessary  - Involve family in performance of ADLs  - Assess for home care needs following discharge   - Consider OT consult to assist with ADL evaluation and planning for discharge  - Provide patient education as appropriate  Outcome: Progressing  Goal: Maintains/Returns to pre admission functional level  Description: INTERVENTIONS:  - Perform AM-PAC 6 Click Basic Mobility/ Daily Activity assessment daily.  - Set and communicate daily mobility goal to care team and patient/family/caregiver.   - Collaborate with rehabilitation services on mobility goals if consulted  - Out of bed for toileting  - Record patient progress and toleration of activity level   Outcome: Progressing     Problem: DISCHARGE PLANNING  Goal: Discharge to home or other facility with appropriate resources  Description: INTERVENTIONS:  - Identify barriers to discharge w/patient and caregiver  - Arrange for needed discharge resources and transportation as appropriate  - Identify discharge learning needs (meds, wound care, etc.)  - Arrange for interpretive services to assist at discharge as needed  - Refer to Case Management Department for coordinating discharge planning if the patient needs post-hospital services based on physician/advanced practitioner order or complex needs  related to functional status, cognitive ability, or social support system  Outcome: Progressing     Problem: GENITOURINARY - ADULT  Goal: Maintains or returns to baseline urinary function  Description: INTERVENTIONS:  - Assess urinary function  - Encourage oral fluids to ensure adequate hydration if ordered  - Administer IV fluids as ordered to ensure adequate hydration  - Administer ordered medications as needed  - Offer frequent toileting  - Follow urinary retention protocol if ordered  Outcome: Progressing  Goal: Absence of urinary retention  Description: INTERVENTIONS:  - Assess patient’s ability to void and empty bladder  - Monitor I/O  - Bladder scan as needed  - Discuss with physician/AP medications to alleviate retention as needed  - Discuss catheterization for long term situations as appropriate  Outcome: Progressing  Goal: Urinary catheter remains patent  Description: INTERVENTIONS:  - Assess patency of urinary catheter  - If patient has a chronic graff, consider changing catheter if non-functioning  - Follow guidelines for intermittent irrigation of non-functioning urinary catheter  Outcome: Progressing     Problem: METABOLIC, FLUID AND ELECTROLYTES - ADULT  Goal: Electrolytes maintained within normal limits  Description: INTERVENTIONS:  - Monitor labs and assess patient for signs and symptoms of electrolyte imbalances  - Administer electrolyte replacement as ordered  - Monitor response to electrolyte replacements, including repeat lab results as appropriate  - Instruct patient on fluid and nutrition as appropriate  Outcome: Progressing  Goal: Fluid balance maintained  Description: INTERVENTIONS:  - Monitor labs   - Monitor I/O and WT  - Instruct patient on fluid and nutrition as appropriate  - Assess for signs & symptoms of volume excess or deficit  Outcome: Progressing

## 2024-08-29 NOTE — NURSING NOTE
Patient d/c'd to home in no distress. No c/o pain, able to make needs known. Instructions were reviewed with the patient. Patient walked to the front to pick medication up from the pharmacy. All belongings left with the patient.

## 2024-08-29 NOTE — TELEPHONE ENCOUNTER
Patient underwent left ureteral stent placement by Dr. Metz on 8/28/2024.  Patient's urine was infected.  He is currently receiving ciprofloxacin and will likely be on the antibiotic for 2 weeks.  Please schedule him to be seen in the office to arrange second stage ureteroscopy for definitive stone management.  Thank you!    **Patient lives in [place] and has not been followed by our office in the past

## 2024-08-29 NOTE — ASSESSMENT & PLAN NOTE
Urine culture is growing Serratia marcescens  Suspect there is some reactionary component due to ureteral stones  Home with cefpodoxime, complete 2-week course  Urology be following closely outpatient and tailor antibiotics to cultures

## 2024-08-29 NOTE — PLAN OF CARE
Problem: PAIN - ADULT  Goal: Verbalizes/displays adequate comfort level or baseline comfort level  Description: Interventions:  - Encourage patient to monitor pain and request assistance  - Assess pain using appropriate pain scale  - Administer analgesics based on type and severity of pain and evaluate response  - Implement non-pharmacological measures as appropriate and evaluate response  - Consider cultural and social influences on pain and pain management  - Notify physician/advanced practitioner if interventions unsuccessful or patient reports new pain  Outcome: Progressing     Problem: INFECTION - ADULT  Goal: Absence or prevention of progression during hospitalization  Description: INTERVENTIONS:  - Assess and monitor for signs and symptoms of infection  - Monitor lab/diagnostic results  - Monitor all insertion sites, i.e. indwelling lines, tubes, and drains  - Monitor endotracheal if appropriate and nasal secretions for changes in amount and color  - [place] appropriate cooling/warming therapies per order  - Administer medications as ordered  - Instruct and encourage patient and family to use good hand hygiene technique  - Identify and instruct in appropriate isolation precautions for identified infection/condition  Outcome: Progressing  Goal: Absence of fever/infection during neutropenic period  Description: INTERVENTIONS:  - Monitor WBC    Outcome: Progressing     Problem: SAFETY ADULT  Goal: Patient will remain free of falls  Description: INTERVENTIONS:  - Educate patient/family on patient safety including physical limitations  - Instruct patient to call for assistance with activity   - Consult OT/PT to assist with strengthening/mobility   - Keep Call bell within reach  - Keep bed low and locked with side rails adjusted as appropriate  - Keep care items and personal belongings within reach  - Initiate and maintain comfort rounds  - Make Fall Risk Sign visible to staff  - Offer Toileting every 2 Hours,  in advance of need  - Obtain necessary fall risk management equipment: bed rails  - Apply yellow socks and bracelet for high fall risk patients  - Consider moving patient to room near nurses station  Outcome: Progressing  Goal: Maintain or return to baseline ADL function  Description: INTERVENTIONS:  -  Assess patient's ability to carry out ADLs; assess patient's baseline for ADL function and identify physical deficits which impact ability to perform ADLs (bathing, care of mouth/teeth, toileting, grooming, dressing, etc.)  - Assess/evaluate cause of self-care deficits   - Assess range of motion  - Assess patient's mobility; develop plan if impaired  - Assess patient's need for assistive devices and provide as appropriate  - Encourage maximum independence but intervene and supervise when necessary  - Involve family in performance of ADLs  - Assess for home care needs following discharge   - Consider OT consult to assist with ADL evaluation and planning for discharge  - Provide patient education as appropriate  Outcome: Progressing  Goal: Maintains/Returns to pre admission functional level  Description: INTERVENTIONS:  - Perform AM-PAC 6 Click Basic Mobility/ Daily Activity assessment daily.  - Set and communicate daily mobility goal to care team and patient/family/caregiver.   - Collaborate with rehabilitation services on mobility goals if consulted  - Stand patient 4 times a day  - Ambulate patient 4 times a day  - Out of bed to chair 2 times a day   - Out of bed for meals 2 times a day  - Out of bed for toileting  - Record patient progress and toleration of activity level   Outcome: Progressing     Problem: DISCHARGE PLANNING  Goal: Discharge to home or other facility with appropriate resources  Description: INTERVENTIONS:  - Identify barriers to discharge w/patient and caregiver  - Arrange for needed discharge resources and transportation as appropriate  - Identify discharge learning needs (meds, wound care,  etc.)  - Arrange for interpretive services to assist at discharge as needed  - Refer to Case Management Department for coordinating discharge planning if the patient needs post-hospital services based on physician/advanced practitioner order or complex needs related to functional status, cognitive ability, or social support system  Outcome: Progressing     Problem: GENITOURINARY - ADULT  Goal: Maintains or returns to baseline urinary function  Description: INTERVENTIONS:  - Assess urinary function  - Encourage oral fluids to ensure adequate hydration if ordered  - Administer IV fluids as ordered to ensure adequate hydration  - Administer ordered medications as needed  - Offer frequent toileting  - Follow urinary retention protocol if ordered  Outcome: Progressing  Goal: Absence of urinary retention  Description: INTERVENTIONS:  - Assess patient’s ability to void and empty bladder  - Monitor I/O  - Bladder scan as needed  - Discuss with physician/AP medications to alleviate retention as needed  - Discuss catheterization for long term situations as appropriate  Outcome: Progressing  Goal: Urinary catheter remains patent  Description: INTERVENTIONS:  - Assess patency of urinary catheter  - If patient has a chronic graff, consider changing catheter if non-functioning  - Follow guidelines for intermittent irrigation of non-functioning urinary catheter  Outcome: Progressing     Problem: METABOLIC, FLUID AND ELECTROLYTES - ADULT  Goal: Electrolytes maintained within normal limits  Description: INTERVENTIONS:  - Monitor labs and assess patient for signs and symptoms of electrolyte imbalances  - Administer electrolyte replacement as ordered  - Monitor response to electrolyte replacements, including repeat lab results as appropriate  - Instruct patient on fluid and nutrition as appropriate  Outcome: Progressing  Goal: Fluid balance maintained  Description: INTERVENTIONS:  - Monitor labs   - Monitor I/O and WT  - Instruct  patient on fluid and nutrition as appropriate  - Assess for signs & symptoms of volume excess or deficit  Outcome: Progressing

## 2024-08-29 NOTE — TELEPHONE ENCOUNTER
----- Message from Radha MARTINEZ sent at 8/29/2024  2:31 PM EDT -----  Regarding: New Pt appt and OPSW request  Pt is in need of a PCP appointment (new patient) after treatment for Serratia Macensens UTI and Kidnty Stone (stent placed).  He is MA pending and therefore requesting OPSW to assist with completion of process, red care and possible med asst program if available.    Thanks   Radha Black, RN [facility]

## 2024-08-29 NOTE — DISCHARGE INSTR - AVS FIRST PAGE
Dear Andrew Mcfadden,     It was our pleasure to care for you here at [facility].  Notable Medication Adjustments -   Take Flomax every day.  This is also called tamsulosin.  This is important for your prostate  You can take oxybutynin 1 tablet daily as needed for bladder spasms and pain.  This can cause constipation.  You can use Senokot daily at night as needed  Continue using Tylenol and ibuprofen around-the-clock.  If you are taking ibuprofen take it with food as this can cause stomach irritation  Continue to stay well-hydrated with water.  Avoid dark in juices and liquids such as soda and tea.  You were sent with oxycodone.  You can take 1 tablet every 8 hours as needed for severe pain.  This can also cause constipation  Continue cefpodoxime.  This is an antibiotic you will take 1 tablet twice a day.  Urology will be closely following her urine cultures and calling you for any changes.  Please take this with food    Important follow up information -   Follow-up with urology  Follow-up with family doctor.  Referrals have been sent  Other Instructions -   If you developing worsening pain, fevers, worsening blood in your urine come back to the emergency room

## 2024-08-29 NOTE — CASE MANAGEMENT
Case Management Assessment & Discharge Planning Note    Patient name Andrew Mcfadden  Location South 2 /South 2 M* MRN [id number]  : 1975 Date 2024       Current Admission Date: 2024  Current Admission Diagnosis:Ureteral calculi   Patient Active Problem List    Diagnosis Date Noted Date Diagnosed    Urinary tract infection 2024     Sepsis (HCC) 2024     Ureteral calculi 2024     SIRS (systemic inflammatory response syndrome) ([facility]) 2024     Kidney stones        LOS (days): 2  Geometric Mean LOS (GMLOS) (days): 2.1  Days to GMLOS:0.3     OBJECTIVE:    Risk of Unplanned Readmission Score: 5.21         Current admission status: Inpatient       Preferred Pharmacy:   [facility] [place]  MAURI Delaney  [id number]  [facility],  [address] [place] [address],  First Floor [facility] [id number]  Phone: [phone] Fax: [phone]    Primary Care Provider: No primary care provider on file.    Primary Insurance: MAURI BILL PENDING  Secondary Insurance:     ASSESSMENT:  Active Health Care Proxies       Bella Mcfadden [facility]   Primary Phone: [phone] (Mobile)                 Advance Directives  Does patient have a Health Care POA?: No  Was patient offered paperwork?: No  Does patient currently have a Health Care decision maker?: Yes, please see Health Care Proxy section  Does patient have Advance Directives?: No  Was patient offered paperwork?: No  [facility]         Readmission Root Cause  30 Day Readmission: No    Patient Information  Admitted from:: Home  Mental Status: Alert  During Assessment patient was accompanied by: Not accompanied during assessment  Assessment information provided by:: Patient  Primary Caregiver: Self  Support Systems: Family members, Friends/neighbors  County of Residence: [place]  What city do you live in?: [place]  Home entry access options. Select all that apply.: Stairs  Number of  steps to enter home.: 4  Do the steps have railings?: Yes  Type of Current Residence: 2 story home  Upon entering residence, is there a bedroom on the main floor (no further steps)?: No  A bedroom is located on the following floor levels of residence (select all that apply):: 2nd Floor  Upon entering residence, is there a bathroom on the main floor (no further steps)?: No  Indicate which floors of current residence have a bathroom (select all the apply):: 2nd Floor  Number of steps to 2nd floor from main floor: One Flight  Living Arrangements: Other (Comment) (Lives with others in the home)  Is patient a ?: No    Activities of Daily Living Prior to Admission  Functional Status: Independent  Completes ADLs independently?: Yes  Ambulates independently?: Yes  Does patient use assisted devices?: No  Does patient currently own DME?: No  Does patient have a history of Outpatient Therapy (PT/OT)?: No  Does the patient have a history of Short-Term Rehab?: No  Does patient have a history of HHC?: No  Does patient currently have HHC?: No         Patient Information Continued  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 N  [shares expenses with others living in the home]   In the past 12 months, how many times have you moved where you were living? 0   At any time in the past 12 months, were you homeless or living in a shelter (including now)? N   Transportation Needs    In the past 12 months, has lack of transportation kept you from medical appointments or from getting medications? no   In  the past 12 months, has lack of transportation kept you from meetings, work, or from getting things needed for daily living? No   Food Insecurity    Within the past 12 months, you worried that your food would run out before you got the money to buy more. Never true   Within the past 12 months, the food you bought just didn't last and you didn't have money to get more. Never true   Utilities    In the past 12 months has the electric, gas, oil, or water company threatened to shut off services in your home? No            DISCHARGE DETAILS:    Discharge planning discussed with:: pt        CM contacted family/caregiver?: No- see comments  Were Treatment Team discharge recommendations reviewed with patient/caregiver?: Yes  Did patient/caregiver verbalize understanding of patient care needs?: Yes       Contacts  Patient Contacts: Bella Mcfadden- sister  Relationship to Patient:: Family  Contact Method: Phone  Phone Number: [phone]  Reason/Outcome: Emergency Contact    Requested Home Health Care         Is the patient interested in HHC at discharge?: No    DME Referral Provided  Referral made for DME?: No    Other Referral/Resources/Interventions Provided:  Financial Resources Provided: Indigent Medication (Pt is 100% eligible for financial asst for meds- understands OTC/Narcotics unable to have asst from hospital.  Vantin, Ditropan and Flomax $133.80)  Interventions: PCP, Outpatient  (information for [facility] placed on AVS- Pt to schedule appointment.  OPSW referral made to assist with MA Application completion as well as red care and possible medication asst if needed)    Would you like to participate in our [facility] service program?  : Yes (CM covering cost of meds with exception of OTC/Narcs)    Treatment Team Recommendation: Home  Discharge Destination Plan:: Home  Transport at Discharge : Automobile (In gargage-driving self home)

## 2024-08-29 NOTE — TELEPHONE ENCOUNTER
Left a voicemail to schedule patient per AP recommendations to schedule a follow up to discuss next phase of surgery. Office phone number given in message. Patient was seen by BM.

## 2024-08-29 NOTE — ASSESSMENT & PLAN NOTE
Secondary to leukocytosis and tachycardia with urinary source.  Patient was also febrile earlier during hospitalization and urine cultures growing Serratia  Suspect ongoing leukocytosis reactionary due to operation, patient is been afebrile over 24 hours and his pain has significantly improved  Will continue antibiotics with cefpodoxime, plan for 2-week course.  Discussed with patient as well as urology Serratia can be a tricky bug and is not always susceptible to cephalosporins.  Given patient's history of Achilles tendon rupture would defer fluoroquinolones at this point.  Blood cultures were not obtained.  Patient is aware that if his pain worsens or he develops fevers he needs to return to the emergency room.

## 2024-08-30 ENCOUNTER — TELEPHONE (OUTPATIENT)
Dept: UROLOGY | Facility: MEDICAL CENTER | Age: 49
End: 2024-08-30

## 2024-08-30 DIAGNOSIS — Z78.9 NEED FOR FOLLOW-UP BY SOCIAL WORKER: ICD-10-CM

## 2024-08-30 DIAGNOSIS — Z59.89 DOES NOT HAVE HEALTH INSURANCE: Primary | ICD-10-CM

## 2024-08-30 RX ORDER — SULFAMETHOXAZOLE/TRIMETHOPRIM 800-160 MG
1 TABLET ORAL EVERY 12 HOURS SCHEDULED
Qty: 28 TABLET | Refills: 0 | Status: SHIPPED | OUTPATIENT
Start: 2024-08-30 | End: 2024-09-13

## 2024-08-30 SDOH — ECONOMIC STABILITY - INCOME SECURITY: OTHER PROBLEMS RELATED TO HOUSING AND ECONOMIC CIRCUMSTANCES: Z59.89

## 2024-08-30 NOTE — TELEPHONE ENCOUNTER
Post Op Note    Andrew Mcfadden is a 48 y.o. male s/p   CYSTOSCOPY RETROGRADE PYELOGRAM WITH INSERTION STENT URETERAL; BLADDER STONE EXTRACTION (Left: Bladder)      performed 8/28/24 with Dr. Metz.     How would you rate your pain on a scale from 1 to 10, 10 being the worst pain ever? 1  Have you had a fever? No  Have your bowel movements been regular? Yes  Do you have any difficulty urinating? No    Do you have any other questions or concerns that I can address at this time?     Pt was advised that per results of UC he is to stop taking antibiotic prescribed previously and start Bactrim.  He was advised it was called in to pharmacy.  Pt is scheduled for sx post op appt on 9/6/24 to discuss 2nd stage surgery.  He states he is not sure he can take this appt and will call back.

## 2024-08-30 NOTE — TELEPHONE ENCOUNTER
Called pt for RN post op assessment and to advise of fu appt on 9/6/24 at 3pm in [facility] to discuss 2nd stage surgery needed per Dr. Metz.  Left msg on  to return call.    Post Op Note    Andrew Mcfadden is a 48 y.o. male s/p CYSTOSCOPY RETROGRADE PYELOGRAM WITH INSERTION STENT URETERAL; BLADDER STONE EXTRACTION (Left: Bladder) performed 8/28/24 with Dr. Metz.      How would you rate your pain on a scale from 1 to 10, 10 being the worst pain ever?   Have you had a fever?   If so, what was your highest temperature? F What is your current temperature? F  Have your bowel movements been regular?   Do you have any difficulty urinating?     Do you have any other questions or concerns that I can address at this time?

## 2024-08-30 NOTE — TELEPHONE ENCOUNTER
Patient was sent home on cefpodoxime mean for UTI.  Urine culture sensitivities have returned and Bactrim offers more appropriate coverage. Stop taking cefpodoxime and begin taking Bactrim. 14 days of Bactrim will be sent to the patient's pharmacy.  Take 1 tablet 2 times daily.  Prescription sent to hometown pharmacy in [facility] as that is the only pharmacy in the patient's chart.  If he would prefer a different pharmacy please let me know.  Please reach out to the office if symptoms worsen or continue following completion of antibiotic regimen.

## 2024-09-06 ENCOUNTER — OFFICE VISIT (OUTPATIENT)
Dept: UROLOGY | Facility: MEDICAL CENTER | Age: 49
End: 2024-09-06

## 2024-09-06 VITALS
SYSTOLIC BLOOD PRESSURE: 140 MMHG | HEART RATE: 87 BPM | DIASTOLIC BLOOD PRESSURE: 90 MMHG | BODY MASS INDEX: 28.64 KG/M2 | WEIGHT: 189 LBS | HEIGHT: 68 IN | OXYGEN SATURATION: 97 %

## 2024-09-06 DIAGNOSIS — N20.1 URETERAL CALCULI: Primary | ICD-10-CM

## 2024-09-06 PROCEDURE — 99214 OFFICE O/P EST MOD 30 MIN: CPT

## 2024-09-06 NOTE — ASSESSMENT & PLAN NOTE
Status post left cystoscopy retrograde pyelogram with ureteral stent placement and bladder stone extraction by Dr. Metz on 8/28/2024   Here to discuss second stage surgery for definitive stone treatment   Patient was treated for a UTI recently 8/30   Patient continues on abx therapy with end date 9/13   Patient remains with left ureteral stent  Ct image from 8/27 showed:   7 mm proximal left ureteral calculus and 6 mm distal left ureteral calculus without significant associated hydroureteronephrosis.  Extensive bilateral nephrolithiasis.  Prostatomegaly.  Patient presented with 1 small stone today   Likely passed distal stone- proximal stone likely still obstructing   Denies fever chills nausea vomiting or severe pain   Ongoing left sided flank pain   Patient continues to stain all urine   OR consent reviewed and signed   OR case request sent   Patient aware that the surgical scheduler will be contacting him

## 2024-09-06 NOTE — PROGRESS NOTES
9/6/2024      Chief Complaint   Patient presents with   • Follow-up     Feeling great discomfort, when voiding feels something sharp on his kidneys, still bleeding today and sometimes large amount of blood       Assessment and Plan    48 y.o. male managed by Dr. Metz     1. Ureteral calculi  Assessment & Plan:  Status post left cystoscopy retrograde pyelogram with ureteral stent placement and bladder stone extraction by Dr. Metz on 8/28/2024   Here to discuss second stage surgery for definitive stone treatment   Patient was treated for a UTI recently 8/30   Patient continues on abx therapy with end date 9/13   Patient remains with left ureteral stent  Ct image from 8/27 showed:   7 mm proximal left ureteral calculus and 6 mm distal left ureteral calculus without significant associated hydroureteronephrosis.  Extensive bilateral nephrolithiasis.  Prostatomegaly.  Patient presented with 1 small stone today   Likely passed distal stone- proximal stone likely still obstructing   Denies fever chills nausea vomiting or severe pain   Ongoing left sided flank pain   Patient continues to stain all urine   OR consent reviewed and signed   OR case request sent   Patient aware that the surgical scheduler will be contacting him   Orders:  -     Case request operating room: CYSTOSCOPY URETEROSCOPY WITH LITHOTRIPSY HOLMIUM LASER, RETROGRADE PYELOGRAM AND INSERTION STENT URETERAL; Standing  -     Case request operating room: CYSTOSCOPY URETEROSCOPY WITH LITHOTRIPSY HOLMIUM LASER, RETROGRADE PYELOGRAM AND INSERTION STENT URETERAL      History of Present Illness  Andrew Mcfadden is a 48 y.o. male here for evaluation of nephrolithiasis. Patient presented to the ED on 8/27 with right-sided flank pain, dysuria and urinary hesitancy. CT in the ED revealed 7 mm proximal left ureteral calculus and 6 mm distal left ureteral calculus without significant associated hydroureteronephrosis. Extensive bilateral nephrolithiasis and  "prostatomegaly. Patient is status post left cystoscopy retrograde pyelogram with ureteral stent placement and bladder stone extraction by Dr. Metz on 8/28/2024:  Operative findings  1.)Stone that had likely been in the lower ureter until just recently, was stuck in the penile urethra about 3 cm in from the meatus.  Urethral meatal stenosis required dilation  The stone was flushed back into the bladder and extracted.     2.  Second stone distal left ureter, about 4 cm above bladder.  Stent went by it with a mild amount of resistance.     3.  Stent upper coils in kidney.  2 mL of contrast injected to confirm placement.  Filling defects consistent with the other stones in the collecting system of the kidney.        Review of Systems   Constitutional:  Negative for activity change, chills, fever and unexpected weight change.   Respiratory:  Negative for cough and shortness of breath.    Cardiovascular:  Negative for chest pain and palpitations.   Gastrointestinal:  Negative for abdominal distention, abdominal pain, constipation, diarrhea, nausea and vomiting.   Genitourinary:  Positive for flank pain (left side). Negative for decreased urine volume, difficulty urinating, dysuria, frequency, hematuria and urgency.   Neurological:  Negative for dizziness and headaches.     Vitals  Vitals:    09/06/24 1506   BP: 140/90   BP Location: Left arm   Patient Position: Sitting   Cuff Size: Adult   Pulse: 87   SpO2: 97%   Weight: 85.7 kg (189 lb)   Height: 5' 8\" (1.727 m)       Physical Exam  Vitals reviewed.   Constitutional:       Appearance: Normal appearance.   HENT:      Head: Normocephalic and atraumatic.   Eyes:      Conjunctiva/sclera: Conjunctivae normal.   Pulmonary:      Effort: Pulmonary effort is normal.   Abdominal:      General: Abdomen is flat. There is no distension.      Palpations: Abdomen is soft.      Tenderness: There is no abdominal tenderness.   Musculoskeletal:         General: Normal range of motion.    "   Cervical back: Normal range of motion.   Skin:     General: Skin is warm and dry.   Neurological:      General: No focal deficit present.      Mental Status: He is alert and oriented to person, place, and time.   Psychiatric:         Mood and Affect: Mood normal.         Behavior: Behavior normal.         Thought Content: Thought content normal.         Judgment: Judgment normal.       Past History  Past Medical History:   Diagnosis Date   • Kidney stones      Social History     Socioeconomic History   • Marital status: Single     Spouse name: None   • Number of children: None   • Years of education: None   • Highest education level: None   Occupational History   • None   Tobacco Use   • Smoking status: Never   • Smokeless tobacco: Never   Vaping Use   • Vaping status: Never Used   Substance and Sexual Activity   • Alcohol use: Never   • Drug use: Never   • Sexual activity: None   Other Topics Concern   • None   Social History Narrative   • None     Social Determinants of Health     Financial Resource Strain: Not on file   Food Insecurity: No Food Insecurity (8/29/2024)    Hunger Vital Sign    • Worried About Running Out of Food in the Last Year: Never true    • Ran Out of Food in the Last Year: Never true   Transportation Needs: No Transportation Needs (8/29/2024)    PRAPARE - Transportation    • Lack of Transportation (Medical): No    • Lack of Transportation (Non-Medical): No   Physical Activity: Not on file   Stress: Not on file   Social Connections: Not on file   Intimate Partner Violence: Not on file   Housing Stability: Low Risk  (8/29/2024)    Housing Stability Vital Sign    • Unable to Pay for Housing in the Last Year: No    • Number of Times Moved in the Last Year: 0    • Homeless in the Last Year: No     Social History     Tobacco Use   Smoking Status Never   Smokeless Tobacco Never     History reviewed. No pertinent family history.    The following portions of the patient's history were reviewed and  "updated as appropriate: allergies, current medications, past medical history, past social history, past surgical history and problem list.    Results  No results found for this or any previous visit (from the past 1 hour(s)).]  No results found for: \"PSA\"  Lab Results   Component Value Date    CALCIUM 8.5 08/29/2024    K 4.1 08/29/2024    CO2 21 08/29/2024     08/29/2024    BUN 14 08/29/2024    CREATININE 0.59 (L) 08/29/2024     Lab Results   Component Value Date    WBC 20.43 (H) 08/29/2024    HGB 12.3 08/29/2024    HCT 36.6 08/29/2024    MCV 90 08/29/2024     08/29/2024       "

## 2024-09-09 ENCOUNTER — PREP FOR PROCEDURE (OUTPATIENT)
Dept: UROLOGY | Facility: MEDICAL CENTER | Age: 49
End: 2024-09-09

## 2024-09-09 DIAGNOSIS — N20.1 URETERAL CALCULI: ICD-10-CM

## 2024-09-09 DIAGNOSIS — Z01.812 PRE-OPERATIVE LABORATORY EXAMINATION: ICD-10-CM

## 2024-09-09 DIAGNOSIS — R39.89 SUSPECTED UTI: Primary | ICD-10-CM

## 2024-09-09 NOTE — TELEPHONE ENCOUNTER
Status post left cystoscopy retrograde pyelogram with ureteral stent placement and bladder stone extraction by Dr. Metz on 8/28/2024. OR case request sent for second stage surgery for definitive stone treatment. Left stent still present. Patient did pass 1 of the obstructing stone out of the 2. Did have distal and proximal stone. Consent reviewed and signed.     Patient saw Marie in office on 9/6. Needs 2nd stage #5 with UC prior.     Called out to schedule, got VM. Left message on machine that I am HOLDING 9/25 @ [place] OR and for patient to please call me back to discuss surgery.

## 2024-09-10 ENCOUNTER — PATIENT OUTREACH (OUTPATIENT)
Dept: CASE MANAGEMENT | Facility: OTHER | Age: 49
End: 2024-09-10

## 2024-09-10 LAB
CALCIUM OXALATE DIHYDRATE MFR STONE IR: 20 %
COLOR STONE: NORMAL
COM MFR STONE: 60 %
COMMENT-STONE3: NORMAL
COMPOSITION: NORMAL
HYDROXYAPATITE 24H ENGDIFF UR: 20 %
LABORATORY COMMENT REPORT: NORMAL
PHOTO: NORMAL
SIZE STONE: NORMAL MM
SPEC SOURCE SUBJ: NORMAL
STONE ANALYSIS-IMP: NORMAL
WT STONE: 341 MG

## 2024-09-10 NOTE — TELEPHONE ENCOUNTER
Spoke with patient and confirmed surgery date of 9/25  Type of surgery: #5   Operating physician: Dr. Metz  Location of surgery: [facility]    Verbally went over prep with patient on 9/10  NPO  Bowel prep? No  Hospital calls afternoon prior with arrival time (calls Friday afternoon for Monday surgery)  Patient needs ride to and from surgery   outpatient  Pre-op testing to be done 2 weeks prior to surgery. All testing can be done as a walk-in. EKG can only be done as a walk-in at any [facility].  Urine culture he is going this week  Blood thinners:   None  Clearances needed: None    Mailedto patient on 9/10  Copy of packet scanned into Media  Labs in packet and in electronic record   Soap prep in packet  nurse will call with post-op information     Faxed consent [phone]

## 2024-09-10 NOTE — PROGRESS NOTES
OP.CM NERY received new referral from inpatient case management regarding patient. He was recently hospitalized from 08/27-08/29. Andrew is without insurance. He was 100 % eligible for financial assistance for medications. He is to schedule with [facility]. NERY SUNG was referred for assistance in [place] , and helping him schedule a PCP appointment. Sheryl outreached patient on 09/06 and patient was unable to reach.     NERY SUNG outreached to number listed for patient. He was unable to reach and a voicemail was left for a return call.

## 2024-09-10 NOTE — TELEPHONE ENCOUNTER
Robin Metz MD  You16 hours ago (3:51 PM)       ok     You  Robin Metz MD; YAMILETH KumarNP19 hours ago (12:08 PM)     [facility] Dr. Metz, I saw you did 1st surgery on 8/28, will schedule him with you for 2nd stage and get UC prior.     Called out again to discuss scheduling surgery. Got VM, left another message.   HOLDING 9/25 @ [facility] OR        Postop Diagnosis: same

## 2024-09-10 NOTE — TELEPHONE ENCOUNTER
Pt returning call to , pt stated 9/25 would work for his schedule. Asking for a call back to confirm the details and pre-op instructions.     Call back: [phone]

## 2024-09-18 ENCOUNTER — APPOINTMENT (OUTPATIENT)
Dept: LAB | Facility: HOSPITAL | Age: 49
End: 2024-09-18
Payer: MEDICAID

## 2024-09-18 DIAGNOSIS — N20.1 URETERAL CALCULI: ICD-10-CM

## 2024-09-18 DIAGNOSIS — R39.89 SUSPECTED UTI: ICD-10-CM

## 2024-09-18 DIAGNOSIS — Z01.812 PRE-OPERATIVE LABORATORY EXAMINATION: ICD-10-CM

## 2024-09-18 LAB
BACTERIA UR QL AUTO: ABNORMAL /HPF
BILIRUB UR QL STRIP: NEGATIVE
BUDDING YEAST: PRESENT
CLARITY UR: ABNORMAL
COLOR UR: ABNORMAL
GLUCOSE UR STRIP-MCNC: NEGATIVE MG/DL
HGB UR QL STRIP.AUTO: ABNORMAL
KETONES UR STRIP-MCNC: NEGATIVE MG/DL
LEUKOCYTE ESTERASE UR QL STRIP: ABNORMAL
NITRITE UR QL STRIP: NEGATIVE
NON-SQ EPI CELLS URNS QL MICRO: ABNORMAL /HPF
PH UR STRIP.AUTO: 5.5 [PH]
PROT UR STRIP-MCNC: ABNORMAL MG/DL
RBC #/AREA URNS AUTO: ABNORMAL /HPF
SP GR UR STRIP.AUTO: 1.02 (ref 1–1.03)
UROBILINOGEN UR STRIP-ACNC: <2 MG/DL
WBC #/AREA URNS AUTO: ABNORMAL /HPF

## 2024-09-18 PROCEDURE — 87086 URINE CULTURE/COLONY COUNT: CPT

## 2024-09-18 PROCEDURE — 81001 URINALYSIS AUTO W/SCOPE: CPT

## 2024-09-19 LAB — BACTERIA UR CULT: NORMAL

## 2024-09-20 NOTE — PRE-PROCEDURE INSTRUCTIONS
Pre-Surgery Instructions:   Medication Instructions    acetaminophen (TYLENOL) 325 mg tablet Uses PRN- OK to take day of surgery    ibuprofen (MOTRIN) 400 mg tablet Stop taking 3 days prior to surgery.    oxybutynin (DITROPAN-XL) 5 mg 24 hr tablet Hold day of surgery.    senna (SENOKOT) 8.6 MG tablet Hold day of surgery.    tamsulosin (FLOMAX) 0.4 mg Take night before surgery    Medication instructions for day surgery reviewed. Please use only a sip of water to take your instructed medications. Avoid all over the counter vitamins, supplements and NSAIDS for one week prior to surgery per anesthesia guidelines. Tylenol is ok to take as needed.     You will receive a call one business day prior to surgery with an arrival time and hospital directions. If your surgery is scheduled on a Monday, the hospital will be calling you on the Friday prior to your surgery. If you have not heard from anyone by 8pm, please call the hospital supervisor through the hospital  at [phone]. ([place] [phone] or [place] [phone]).    Do not eat or drink anything after midnight the night before your surgery, including candy, mints, lifesavers, or chewing gum. Do not drink alcohol 24hrs before your surgery. Try not to smoke at least 24hrs before your surgery.       Follow the pre surgery showering instructions as listed in the “My Surgical Experience Booklet” or otherwise provided by your surgeon's office. Do not use a blade to shave the surgical area 1 week before surgery. It is okay to use a clean electric clippers up to 24 hours before surgery. Do not apply any lotions, creams, including makeup, cologne, deodorant, or perfumes after showering on the day of your surgery. Do not use dry shampoo, hair spray, hair gel, or any type of hair products.     No contact lenses, eye make-up, or artificial eyelashes. Remove nail polish, including gel polish, and any artificial, gel, or acrylic nails if possible. Remove all  jewelry including rings and body piercing jewelry.     Wear causal clothing that is easy to take on and off. Consider your type of surgery.    Keep any valuables, jewelry, piercings at home. Please bring any specially ordered equipment (sling, braces) if indicated.    Arrange for a responsible person to drive you to and from the hospital on the day of your surgery. Please confirm the visitor policy for the day of your procedure when you receive your phone call with an arrival time.     Call the surgeon's office with any new illnesses, exposures, or additional questions prior to surgery.    Please reference your “My Surgical Experience Booklet” for additional information to prepare for your upcoming surgery.    Pt will get antibacterial soap.

## 2024-09-24 ENCOUNTER — ANESTHESIA EVENT (OUTPATIENT)
Dept: PERIOP | Facility: HOSPITAL | Age: 49
End: 2024-09-24
Payer: MEDICAID

## 2024-09-24 PROCEDURE — NC001 PR NO CHARGE: Performed by: UROLOGY

## 2024-09-24 NOTE — H&P
HISTORY AND PHYSICAL  ?  ?  Patient Name: Andrew Mcfadden  Patient MRN: [id number]  Attending Provider: Robin Metz MD  Service: Urology  Chief Complaint    Left renal stones    HPI   Andrew Mcfadden is a 49 y.o. male with recently had stent placement for sepsis with obstructing left renal stone.    CT showed:   Multiple nonobstructing bilateral intrarenal calculi measuring up to 1 cm on the right. There is a 7 mm calculus in the proximal left ureter as well as a 6 mm calculus in the distal ureter adjacent to the urinary bladder without   significant associated hydroureteronephrosis.    I plan cystoscopy, left ureteroscopy, laser stones, stent replacement.  Potential risks and complications discussed, and informed consent was given by the patient.  Medications  Meds/Allergies   No current facility-administered medications for this encounter.      Cannot display prior to admission medications because the patient has not been admitted in this contact.     No current facility-administered medications for this encounter.    Current Outpatient Medications:     acetaminophen (TYLENOL) 325 mg tablet, Take 2 tablets (650 mg total) by mouth every 6 (six) hours, Disp: , Rfl:     ibuprofen (MOTRIN) 400 mg tablet, Take 1 tablet (400 mg total) by mouth every 6 (six) hours as needed for moderate pain, Disp: , Rfl:     oxybutynin (DITROPAN-XL) 5 mg 24 hr tablet, Take 1 tablet (5 mg total) by mouth daily as needed (bladder spasms), Disp: 14 tablet, Rfl: 0    senna (SENOKOT) 8.6 MG tablet, Take 1 tablet (8.6 mg total) by mouth daily at bedtime as needed for constipation, Disp: 30 tablet, Rfl: 0    tamsulosin (FLOMAX) 0.4 mg, Take 1 capsule (0.4 mg total) by mouth daily with dinner, Disp: 30 capsule, Rfl: 0  Review of Systems  10 point review of systems negative except as noted in HPI  Allergies  No Known Allergies  PMH  Past Medical History:   Diagnosis Date    Headache     Kidney stones     Neck pain      Past surgical history  Past  Surgical History:   Procedure Laterality Date    ACHILLES TENDON SURGERY Left 2010    FL RETROGRADE PYELOGRAM  08/28/2024    MI CYSTO BLADDER W/URETERAL CATHETERIZATION Left 08/28/2024    Procedure: CYSTOSCOPY RETROGRADE PYELOGRAM WITH INSERTION STENT URETERAL; BLADDER STONE EXTRACTION;  Surgeon: Robin Metz MD;  Location: [facility];  Service: Urology     Social history  Social History     Tobacco Use    Smoking status: Never    Smokeless tobacco: Never   Vaping Use    Vaping status: Never Used   Substance Use Topics    Alcohol use: Yes     Comment: rarely    Drug use: Never     ?  Physical Exam    .vs  General appearance: alert and oriented, in no acute distress  Head: Normocephalic, without obvious abnormality, atraumatic  Throat: lips, mucosa, and tongue normal; teeth and gums normal  Neck: no adenopathy, no carotid bruit, no JVD, supple, symmetrical, trachea midline, and thyroid not enlarged, symmetric, no tenderness/mass/nodules  Lungs: clear to auscultation bilaterally  Heart: regular rate and rhythm, S1, S2 normal, no murmur, click, rub or gallop  Abdomen: soft, non-tender; bowel sounds normal; no masses,  no organomegaly  Extremities: extremities normal, warm and well-perfused; no cyanosis, clubbing, or edema  Robin Metz MD

## 2024-09-24 NOTE — UTILIZATION REVIEW
NOTIFICATION OF INPATIENT ADMISSION   AUTHORIZATION REQUEST   SERVICING FACILITY:   [place] [address]  Tax ID: [id number]  NPI: [id number] ATTENDING PROVIDER:  Attending Name and NPI#: Tonya Koenig Md [[id number]]  Address: [address] [place] [address]  Phone: [phone]     ADMISSION INFORMATION:  Place of Service: Inpatient [facility]  Place of Service Code: 21  Inpatient Admission Date/Time: 8/27/24  6:47 PM  Discharge Date/Time: 8/29/2024  6:19 PM  Admitting Diagnosis Code/Description:  Nephrolithiasis [N20.0]  Ureteral calculi [N20.1]  UTI (urinary tract infection) [N39.0]  Difficulty urinating [R39.198]     UTILIZATION REVIEW CONTACT:  Christy Morales, Utilization   Network Utilization Review Department  Phone: [phone]  Fax [phone]  Email: [email or web address]  Contact for approvals/pending authorizations, clinical reviews, and discharge.     PHYSICIAN ADVISORY SERVICES:  Medical Necessity Denial & Xeqa-py-Gzzg Review  Phone: [phone]  Fax: [phone]  Email: [email or web address]     DISCHARGE SUPPORT TEAM:  For Patients Discharge Needs & Updates  Phone: [phone] opt. 2 Fax: [phone]  Email: [email or web address]       Janny Cortez RN     Specialty: Utilization Review     Utilization Review      Addendum     Date of Service: 8/28/2024  9:35 AM     Expand All Collapse All    Initial Clinical Review     Admission: Date/Time/Statement:   Admission Orders (From admission, onward)          Ordered         08/27/24 1847   INPATIENT ADMISSION  Once                                     Orders Placed This Encounter   Procedures    INPATIENT ADMISSION       Standing Status:   Standing       Number of Occurrences:   1       Order Specific Question:   Level of Care       Answer:   Med Surg [16]       Order Specific  "Question:   Estimated length of stay       Answer:   More than 2 Midnights       Order Specific Question:   Certification       Answer:   I certify that inpatient services are medically necessary for this patient for a duration of greater than two midnights. See H&P and MD Progress Notes for additional information about the patient's course of treatment.      ED Arrival Information         Expected   -    Arrival   8/27/2024 13:32    Acuity   Urgent                 Means of arrival   Walk-In    Escorted by   Self    Service   Hospitalist    Admission type   Emergency                 Arrival complaint   flu like symptoms                     Chief Complaint   Patient presents with    Difficulty Urinating       Pt c/o difficulty urinating with generalized whole body aches for the past x 2 days. Pt also c/o fever. Pt last took tylenol at 08:00 PTA. Pt denies cp/sob         Initial Presentation: 48 y.o. male presented to the ER with complaints of right sides flank pain, complains of dysuria and urinary hesitancy. Has long standing history of kidney stones, follows with outpatient urology.  on arrival. PMH: kidney stones. On Exam: pt is positive for abdominal pain, difficulty urinating, flank pain and dysuria. Abnormal labs\" WBC 18.82, Potassium 3.4. CT scan abdomen/pelvis shows 7 mm proximal left ureteral calculus and 6 mm left distal ureteral calculus w/o significant associated hydroureteronephrosis. Extensive bilateral nephrolithiasis. Prostatomegaly. Patient meets with SIRS criteria with elevated WBC and tachycardia, . Urine cultures pending. IV antibiotics Rocephin given in the ED x 1, IVF's, made NPO and Toradol IV given for pain control. Plan: admit to inpatient, NPO,IVF's, Consult Neurology, Pain control, IV antibiotics. Repeat US bladder in the AM.      8/27  Urology : Continue broad spectrum antibiotics, await culture. Repeat kidney/bladder ultrasound in AM to re-evaluate stone status. NPO at " midnight incase surgical intervention is needed. Official consult will be completed by urology team tomorrow, 8/28.      Anticipated Length of Stay/Certification Statement:  Patient will be admitted on an inpatient basis with an anticipated length of stay of greater than 2 midnights secondary to nephrolithiasis              ED Triage Vitals [08/27/24 1337]   Temperature Pulse Respirations Blood Pressure SpO2 Pain Score   98.8 °F (37.1 °C) (!) 109 18 154/88 99 % 9      Weight (last 2 days)         Date/Time Weight     08/27/24 1337 86.4 (190.48)                Vital Signs (last 3 days)         Date/Time Temp Pulse Resp BP MAP (mmHg) SpO2 O2 Device Patient Position - Orthostatic VS Pain     08/28/24 0820 -- -- -- -- -- -- -- -- 8 08/28/24 08:07:24 98.5 °F (36.9 °C) 89 16 123/74 90 97 % -- Lying --     08/28/24 04:39:50 99 °F (37.2 °C) 94 -- -- -- 97 % -- -- --     08/28/24 0316 -- -- -- -- -- -- -- -- 10 - Worst Possible Pain     08/28/24 0313 -- -- -- -- -- -- -- -- 9 08/28/24 02:43:40 102.1 °F (38.9 °C) 109 -- -- -- 97 % -- -- --     08/27/24 23:59:29 100.6 °F (38.1 °C) 99 -- -- -- 97 % -- -- --     08/27/24 2312 101.6 °F (38.7 °C) 97 -- -- -- 97 % -- -- --     08/27/24 2230 -- -- -- -- -- -- -- -- 7     08/27/24 22:14:47 102.1 °F (38.9 °C) 102 -- 141/94 110 95 % -- -- --     08/27/24 2158 -- -- -- -- -- -- -- -- 8 08/27/24 2000 -- 105 18 162/95 123 100 % None (Room air) Lying --     08/27/24 1956 -- 102 -- 158/92 117 99 % None (Room air) Lying 8     08/27/24 1800 -- 108 18 144/92 110 96 % None (Room air) Sitting --     08/27/24 1730 -- 104 18 143/88 109 99 % None (Room air) Sitting --     08/27/24 1726 -- -- -- -- -- -- -- -- 8 08/27/24 1508 -- -- -- -- -- -- -- -- 9 08/27/24 1404 -- -- -- -- -- -- None (Room air) -- --     08/27/24 1357 -- -- -- -- -- -- -- -- 9 08/27/24 1337 98.8 °F (37.1 °C) 109 18 154/88 113 99 % None (Room air) Sitting 9                   Pertinent Labs/Diagnostic Test  Results:   Radiology:  US kidney and bladder   Final Interpretation by Rivera Smith MD (08/27 1944)       No evidence for hydronephrosis with multiple intrarenal calculi again noted and seen to better advantage on prior CT performed earlier the same day.       Please note that the calculi along the course of the proximal and distal left ureter should be correlated with earlier CT and are not clearly imaged on the current exam.               Workstation performed: [id number]           CT abdomen pelvis with contrast   Final Interpretation by Bayron Foley MD (08/27 1534)       7 mm proximal left ureteral calculus and 6 mm distal left ureteral calculus without significant associated hydroureteronephrosis.   Extensive bilateral nephrolithiasis.   Prostatomegaly.           Workstation performed: [id number]           US kidney and bladder    (Results Pending)                    Results from last 7 days   Lab Units 08/27/24  1726   SARS-COV-2   Negative            Results from last 7 days   Lab Units 08/28/24  0529 08/27/24  1359   WBC Thousand/uL 20.97* 18.82*   HEMOGLOBIN g/dL 12.5 14.0   HEMATOCRIT % 37.7 41.7   PLATELETS Thousands/uL 162 201   TOTAL NEUT ABS Thousands/µL 17.61* 15.97*                Results from last 7 days   Lab Units 08/28/24  0529 08/27/24  1359   SODIUM mmol/L 132* 135   POTASSIUM mmol/L 3.6 3.4*   CHLORIDE mmol/L 104 104   CO2 mmol/L 22 24   ANION GAP mmol/L 6 7   BUN mg/dL 9 11   CREATININE mg/dL 0.74 0.83   EGFR ml/min/1.73sq m 110 105   CALCIUM mg/dL 8.4 8.9   MAGNESIUM mg/dL 1.9  --    PHOSPHORUS mg/dL 2.6*  --            Results from last 7 days   Lab Units 08/28/24  0529   AST U/L 12*   ALT U/L 9   ALK PHOS U/L 74   TOTAL PROTEIN g/dL 6.9   ALBUMIN g/dL 3.7   TOTAL BILIRUBIN mg/dL 2.55*                Results from last 7 days   Lab Units 08/28/24  0529 08/27/24  1359   GLUCOSE RANDOM mg/dL 125 112                          Results from last 7 days   Lab Units 08/27/24  1549   CLARITY UA    Clear   COLOR UA   Yellow   SPEC GRAV UA   1.010   PH UA   7.0   GLUCOSE UA mg/dl Negative   KETONES UA mg/dl Trace*   BLOOD UA   Trace*   PROTEIN UA mg/dl Negative   NITRITE UA   Negative   BILIRUBIN UA   Negative   UROBILINOGEN UA E.U./dl 0.2   LEUKOCYTES UA   Small*   WBC UA /hpf 20-30*   RBC UA /hpf 4-10*   BACTERIA UA /hpf Occasional   EPITHELIAL CELLS WET PREP /hpf Occasional   MUCUS THREADS   Occasional*           Results from last 7 days   Lab Units 08/27/24  1726   INFLUENZA A PCR   Negative   INFLUENZA B PCR   Negative   RSV PCR   Negative                    ED Treatment-Medication Administration from 08/27/2024 1332 to 08/27/2024 2153           Date/Time Order Dose Route Action       08/27/2024 1357 ketorolac (TORADOL) injection 15 mg 15 mg Intravenous Given       08/27/2024 1435 sodium chloride 0.9 % bolus 1,000 mL 1,000 mL Intravenous New Bag       08/27/2024 1442 iohexol (OMNIPAQUE) 350 MG/ML injection (MULTI-DOSE) 100 mL 100 mL Intravenous Given       08/27/2024 1508 morphine injection 2 mg 2 mg Intravenous Given       08/27/2024 1726 ceftriaxone (ROCEPHIN) 1 g/50 mL in dextrose IVPB 1,000 mg Intravenous New Bag       08/27/2024 1726 morphine injection 4 mg 4 mg Intravenous Given       08/27/2024 2017 potassium chloride (Klor-Con M20) CR tablet 40 mEq 40 mEq Oral Given                Medical History        Past Medical History:   Diagnosis Date    Kidney stones           Present on Admission:   Kidney stones   SIRS (systemic inflammatory response syndrome) (HCC)   Hypokalemia        Admitting Diagnosis: Nephrolithiasis [N20.0]  Ureteral calculi [N20.1]  UTI (urinary tract infection) [N39.0]  Difficulty urinating [R39.198]  Age/Sex: 48 y.o. male  Admission Orders:  Scheduled Medications:    Scheduled Medications ONLY (does not pull in infusions nor PRN medications order   cefTRIAXone, 1,000 mg, Intravenous, Once  docusate sodium, 100 mg, Oral, BID  tamsulosin, 0.8 mg, Oral, Daily With Dinner          Continuous IV Infusions:    Infusions Meds - Displays dose, route, & frequency only   multi-electrolyte, 125 mL/hr, Intravenous, Continuous         PRN Meds:  acetaminophen, 650 mg, Oral, Q6H PRN  ketorolac, 15 mg, Intravenous, Q6H PRN x 1 dose 8/27 0316  ketorolac, 30 mg, Intravenous, Q6H PRN x 1 doses 8/28 0820  ondansetron, 4 mg, Intravenous, Q6H PRN  Morphine 2m g IV x 1 dose      ORDERS:   NPO  I/O's  US kidney/bladder   Strain urine        IP CONSULT TO UROLOGY     Network Utilization Review Department  ATTENTION: Please call with any questions or concerns to [phone] and carefully listen to the prompts so that you are directed to the right person. All voicemails are confidential.   For Discharge needs, contact Care Management DC Support Team at [phone] opt. 2  Send all requests for admission clinical reviews, approved or denied determinations and any other requests to dedicated fax number below belonging to the [facility] where the patient is receiving treatment. List of dedicated fax numbers for the Facilities:  FACILITY NAME UR FAX NUMBER   ADMISSION DENIALS (Administrative/Medical Necessity) [phone]   DISCHARGE SUPPORT TEAM (NETWORK) [phone]   PARENT CHILD HEALTH (Maternity/NICU/Pediatrics) [phone]   [facility] [phone]   [facility] [phone]   [facility] [phone]   [facility] [phone]   [facility] [phone]   [facility] [phone]   [facility] [phone]   [facility] [phone]   [facility] [phone]   [facility] [phone]   [facility] [phone]   [facility] [phone]                  Revision History

## 2024-09-25 ENCOUNTER — HOSPITAL ENCOUNTER (OUTPATIENT)
Facility: HOSPITAL | Age: 49
Setting detail: OUTPATIENT SURGERY
Discharge: HOME | End: 2024-09-25
Attending: UROLOGY | Admitting: UROLOGY
Payer: MEDICAID

## 2024-09-25 ENCOUNTER — ANESTHESIA (OUTPATIENT)
Dept: PERIOP | Facility: HOSPITAL | Age: 49
End: 2024-09-25
Payer: MEDICAID

## 2024-09-25 ENCOUNTER — APPOINTMENT (OUTPATIENT)
Dept: RADIOLOGY | Facility: HOSPITAL | Age: 49
End: 2024-09-25
Payer: MEDICAID

## 2024-09-25 VITALS
RESPIRATION RATE: 16 BRPM | DIASTOLIC BLOOD PRESSURE: 80 MMHG | HEIGHT: 68 IN | WEIGHT: 188.71 LBS | SYSTOLIC BLOOD PRESSURE: 131 MMHG | OXYGEN SATURATION: 98 % | HEART RATE: 67 BPM | BODY MASS INDEX: 28.6 KG/M2 | TEMPERATURE: 97.8 F

## 2024-09-25 DIAGNOSIS — N20.1 URETERAL CALCULI: ICD-10-CM

## 2024-09-25 PROCEDURE — 87086 URINE CULTURE/COLONY COUNT: CPT | Performed by: UROLOGY

## 2024-09-25 PROCEDURE — 52356 CYSTO/URETERO W/LITHOTRIPSY: CPT | Performed by: UROLOGY

## 2024-09-25 PROCEDURE — 74420 UROGRAPHY RTRGR +-KUB: CPT

## 2024-09-25 PROCEDURE — C2617 STENT, NON-COR, TEM W/O DEL: HCPCS | Performed by: UROLOGY

## 2024-09-25 PROCEDURE — C1769 GUIDE WIRE: HCPCS | Performed by: UROLOGY

## 2024-09-25 PROCEDURE — C1747 URETEROSCOPE DIGITAL FLEX SNGL USE STD DEFLECTION APTRA: HCPCS | Performed by: UROLOGY

## 2024-09-25 RX ORDER — PROPOFOL 10 MG/ML
INJECTION, EMULSION INTRAVENOUS AS NEEDED
Status: DISCONTINUED | OUTPATIENT
Start: 2024-09-25 | End: 2024-09-25

## 2024-09-25 RX ORDER — LIDOCAINE HYDROCHLORIDE 20 MG/ML
INJECTION, SOLUTION EPIDURAL; INFILTRATION; INTRACAUDAL; PERINEURAL AS NEEDED
Status: DISCONTINUED | OUTPATIENT
Start: 2024-09-25 | End: 2024-09-25

## 2024-09-25 RX ORDER — OXYBUTYNIN CHLORIDE 5 MG/1
TABLET ORAL
Qty: 15 TABLET | Refills: 0 | Status: SHIPPED | OUTPATIENT
Start: 2024-09-25

## 2024-09-25 RX ORDER — OXYCODONE AND ACETAMINOPHEN 5; 325 MG/1; MG/1
2 TABLET ORAL EVERY 4 HOURS PRN
Status: DISCONTINUED | OUTPATIENT
Start: 2024-09-25 | End: 2024-09-25 | Stop reason: HOSPADM

## 2024-09-25 RX ORDER — MAGNESIUM HYDROXIDE 1200 MG/15ML
LIQUID ORAL AS NEEDED
Status: DISCONTINUED | OUTPATIENT
Start: 2024-09-25 | End: 2024-09-25 | Stop reason: HOSPADM

## 2024-09-25 RX ORDER — HYDROMORPHONE HCL/PF 1 MG/ML
0.5 SYRINGE (ML) INJECTION
Status: DISCONTINUED | OUTPATIENT
Start: 2024-09-25 | End: 2024-09-25 | Stop reason: HOSPADM

## 2024-09-25 RX ORDER — CEFAZOLIN SODIUM 2 G/50ML
2000 SOLUTION INTRAVENOUS ONCE
Status: COMPLETED | OUTPATIENT
Start: 2024-09-25 | End: 2024-09-25

## 2024-09-25 RX ORDER — PHENAZOPYRIDINE HYDROCHLORIDE 200 MG/1
200 TABLET, FILM COATED ORAL 3 TIMES DAILY PRN
Qty: 20 TABLET | Refills: 0 | Status: SHIPPED | OUTPATIENT
Start: 2024-09-25

## 2024-09-25 RX ORDER — ONDANSETRON 2 MG/ML
INJECTION INTRAMUSCULAR; INTRAVENOUS AS NEEDED
Status: DISCONTINUED | OUTPATIENT
Start: 2024-09-25 | End: 2024-09-25

## 2024-09-25 RX ORDER — CEFUROXIME AXETIL 250 MG/1
250 TABLET ORAL EVERY 12 HOURS SCHEDULED
Qty: 14 TABLET | Refills: 0 | Status: SHIPPED | OUTPATIENT
Start: 2024-09-25 | End: 2024-09-25

## 2024-09-25 RX ORDER — ONDANSETRON 2 MG/ML
4 INJECTION INTRAMUSCULAR; INTRAVENOUS ONCE AS NEEDED
Status: DISCONTINUED | OUTPATIENT
Start: 2024-09-25 | End: 2024-09-25 | Stop reason: HOSPADM

## 2024-09-25 RX ORDER — PHENAZOPYRIDINE HYDROCHLORIDE 100 MG/1
100 TABLET, FILM COATED ORAL
Status: DISCONTINUED | OUTPATIENT
Start: 2024-09-25 | End: 2024-09-25 | Stop reason: HOSPADM

## 2024-09-25 RX ORDER — MIDAZOLAM HYDROCHLORIDE 2 MG/2ML
INJECTION, SOLUTION INTRAMUSCULAR; INTRAVENOUS AS NEEDED
Status: DISCONTINUED | OUTPATIENT
Start: 2024-09-25 | End: 2024-09-25

## 2024-09-25 RX ORDER — DEXAMETHASONE SODIUM PHOSPHATE 10 MG/ML
INJECTION, SOLUTION INTRAMUSCULAR; INTRAVENOUS AS NEEDED
Status: DISCONTINUED | OUTPATIENT
Start: 2024-09-25 | End: 2024-09-25

## 2024-09-25 RX ORDER — FENTANYL CITRATE/PF 50 MCG/ML
25 SYRINGE (ML) INJECTION
Status: DISCONTINUED | OUTPATIENT
Start: 2024-09-25 | End: 2024-09-25 | Stop reason: HOSPADM

## 2024-09-25 RX ORDER — SODIUM CHLORIDE 9 MG/ML
125 INJECTION, SOLUTION INTRAVENOUS CONTINUOUS
Status: DISCONTINUED | OUTPATIENT
Start: 2024-09-25 | End: 2024-09-25 | Stop reason: HOSPADM

## 2024-09-25 RX ORDER — SULFAMETHOXAZOLE/TRIMETHOPRIM 800-160 MG
1 TABLET ORAL 2 TIMES DAILY
Qty: 14 TABLET | Refills: 0 | Status: SHIPPED | OUTPATIENT
Start: 2024-09-25 | End: 2024-10-02

## 2024-09-25 RX ORDER — ROCURONIUM BROMIDE 10 MG/ML
INJECTION, SOLUTION INTRAVENOUS AS NEEDED
Status: DISCONTINUED | OUTPATIENT
Start: 2024-09-25 | End: 2024-09-25

## 2024-09-25 RX ORDER — FENTANYL CITRATE 50 UG/ML
INJECTION, SOLUTION INTRAMUSCULAR; INTRAVENOUS AS NEEDED
Status: DISCONTINUED | OUTPATIENT
Start: 2024-09-25 | End: 2024-09-25

## 2024-09-25 RX ORDER — KETOROLAC TROMETHAMINE 30 MG/ML
INJECTION, SOLUTION INTRAMUSCULAR; INTRAVENOUS AS NEEDED
Status: DISCONTINUED | OUTPATIENT
Start: 2024-09-25 | End: 2024-09-25

## 2024-09-25 RX ORDER — OXYCODONE AND ACETAMINOPHEN 5; 325 MG/1; MG/1
1 TABLET ORAL EVERY 4 HOURS PRN
Status: DISCONTINUED | OUTPATIENT
Start: 2024-09-25 | End: 2024-09-25 | Stop reason: HOSPADM

## 2024-09-25 RX ORDER — HYDROCODONE BITARTRATE AND ACETAMINOPHEN 5; 325 MG/1; MG/1
1 TABLET ORAL EVERY 6 HOURS PRN
Qty: 20 TABLET | Refills: 0 | Status: SHIPPED | OUTPATIENT
Start: 2024-09-25 | End: 2024-10-05

## 2024-09-25 RX ADMIN — OXYCODONE HYDROCHLORIDE AND ACETAMINOPHEN 1 TABLET: 5; 325 TABLET ORAL at 15:20

## 2024-09-25 RX ADMIN — ROCURONIUM BROMIDE 50 MG: 10 INJECTION, SOLUTION INTRAVENOUS at 11:34

## 2024-09-25 RX ADMIN — SODIUM CHLORIDE 125 ML/HR: 0.9 INJECTION, SOLUTION INTRAVENOUS at 13:39

## 2024-09-25 RX ADMIN — PHENAZOPYRIDINE 100 MG: 100 TABLET ORAL at 15:27

## 2024-09-25 RX ADMIN — LIDOCAINE HYDROCHLORIDE 100 MG: 20 INJECTION, SOLUTION EPIDURAL; INFILTRATION; INTRACAUDAL at 11:34

## 2024-09-25 RX ADMIN — DEXAMETHASONE SODIUM PHOSPHATE 10 MG: 10 INJECTION INTRAMUSCULAR; INTRAVENOUS at 11:34

## 2024-09-25 RX ADMIN — PROPOFOL 200 MG: 10 INJECTION, EMULSION INTRAVENOUS at 11:34

## 2024-09-25 RX ADMIN — SODIUM CHLORIDE 125 ML/HR: 0.9 INJECTION, SOLUTION INTRAVENOUS at 09:55

## 2024-09-25 RX ADMIN — FENTANYL CITRATE 25 MCG: 50 INJECTION, SOLUTION INTRAMUSCULAR; INTRAVENOUS at 12:29

## 2024-09-25 RX ADMIN — SUGAMMADEX 200 MG: 100 INJECTION, SOLUTION INTRAVENOUS at 12:41

## 2024-09-25 RX ADMIN — KETOROLAC TROMETHAMINE 30 MG: 30 INJECTION, SOLUTION INTRAMUSCULAR; INTRAVENOUS at 12:24

## 2024-09-25 RX ADMIN — IOHEXOL 7 ML: 300 INJECTION, SOLUTION INTRAVENOUS at 12:53

## 2024-09-25 RX ADMIN — ONDANSETRON 4 MG: 2 INJECTION INTRAMUSCULAR; INTRAVENOUS at 12:24

## 2024-09-25 RX ADMIN — FENTANYL CITRATE 50 MCG: 50 INJECTION, SOLUTION INTRAMUSCULAR; INTRAVENOUS at 11:34

## 2024-09-25 RX ADMIN — CEFAZOLIN SODIUM 2000 MG: 2 SOLUTION INTRAVENOUS at 11:40

## 2024-09-25 RX ADMIN — FENTANYL CITRATE 25 MCG: 50 INJECTION, SOLUTION INTRAMUSCULAR; INTRAVENOUS at 12:49

## 2024-09-25 RX ADMIN — FENTANYL CITRATE 25 MCG: 50 INJECTION, SOLUTION INTRAMUSCULAR; INTRAVENOUS at 13:18

## 2024-09-25 RX ADMIN — MIDAZOLAM HYDROCHLORIDE 2 MG: 1 INJECTION, SOLUTION INTRAMUSCULAR; INTRAVENOUS at 11:22

## 2024-09-25 RX ADMIN — FENTANYL CITRATE 25 MCG: 50 INJECTION, SOLUTION INTRAMUSCULAR; INTRAVENOUS at 13:13

## 2024-09-25 NOTE — DISCHARGE INSTR - AVS FIRST PAGE
ALL YOUR  PREVIOUS MEDS ARE THE SAME.    NEW MEDS: Hydrocodone if needed for pain  Oxybutynin if needed for bladder pressure    BE CAREFUL NOT TO PULL THE STRING.    EXPECT SOME BLOOD IN URINE, BURNING, FREQUENT URINATION.    ACTIVITY:  RESUME FULL ACTIVITY after stent is out.

## 2024-09-25 NOTE — ANESTHESIA PREPROCEDURE EVALUATION
Procedure:  CYSTO USCOPE W/ LASER,& STENT (Left: Bladder)    Relevant Problems   /RENAL   (+) Kidney stones        Physical Exam    Airway    Mallampati score: II         Dental       Cardiovascular  Rhythm: regular, Rate: normal    Pulmonary   Breath sounds clear to auscultation    Other Findings        Anesthesia Plan  ASA Score- 2     Anesthesia Type- general with ASA Monitors.         Additional Monitors:     Airway Plan:            Plan Factors-Exercise tolerance (METS): >4 METS.    Chart reviewed.   Existing labs reviewed. Patient summary reviewed.    Patient is not a current smoker.      Obstructive sleep apnea risk education given perioperatively.        Induction- intravenous.    Postoperative Plan-     Perioperative Resuscitation Plan - Level 1 - Full Code.       Informed Consent- Anesthetic plan and risks discussed with patient.

## 2024-09-25 NOTE — ANESTHESIA POSTPROCEDURE EVALUATION
Post-Op Assessment Note    CV Status:  Stable  Pain Score: 0    Pain management: adequate    Multimodal analgesia used between 6 hours prior to anesthesia start to PACU discharge    Mental Status:  Alert and awake   Hydration Status:  Euvolemic   PONV Controlled:  Controlled   Airway Patency:  Patent     Post Op Vitals Reviewed: Yes    No anethesia notable event occurred.    Staff: CRNA               /72 (09/25/24 1256)    Temp 97.8 °F (36.6 °C) (09/25/24 1256)    Pulse 75 (09/25/24 1256)   Resp 16 (09/25/24 1256)    SpO2 98 % (09/25/24 1256)

## 2024-09-25 NOTE — OP NOTE
OPERATIVE REPORT  PATIENT NAME: Andrew Mcfadden    :  1975  MRN: [id number]  Pt Location: [facility] OR ROOM 04    SURGERY DATE: 2024    Surgeons and Role:     * Robin Metz MD - Primary    Preop Diagnosis:  Ureteral calculi [N20.1]    Post-Op Diagnosis Codes:     * Ureteral calculi [N20.1]    Procedure(s):  Left - CYSTO USCOPE W/ LASER.& STENT    Specimen(s):  ID Type Source Tests Collected by Time Destination   A : Urine Culture Urine Urine, Cystoscopic URINE CULTURE Robin Metz MD 2024 12:43 PM        Estimated Blood Loss:   Minimal    Drains:  Ureteral Internal Stent Left ureter 6 Fr. (Active)   Number of days: 28       Ureteral Internal Stent Left ureter 6 Fr. (Active)   Number of days: 0       Anesthesia Type:   General    Operative Indications:  Ureteral calculi [N20.1]  And renal calculi    Operative Findings:  4 to 5 stones in left kidney, ranging 4 to 8 mm.  Small stone particles in distal left ureter, flushed out      Complications:   None    Procedure and Technique:      PLAN FOR STENT: Will be removed by nurse next Thursday or Friday      The patient was brought into the OR, properly identified and positioned on the table.  General anesthesia was induced, and they were placed in lithotomy position and prepped and draped using chlorhexidine solution.    The 22F scope was introduced in the urethra and bladder.  Other findings upon placement of the scope included mild proximal enlargement, mild inflammation of bladder from existing stent..    The left ureteral stent was grasped and withdrawn.    One wire was placed up the ureter. The rigid ureteroscope was introduced and carefully advanced up the ureter.  There was a small stone embedded in the wall which flushed out with the scope.  I could not see any other stones in distal ureter or in the wall.  A second wire was placed,  an access sheath was placed over one wire.   The flexible scope was placed thru a sheath and advanced to stone in  various calyces of the kidney, from upper pole to lower pole. The Holmium laser was used on various settings to break up stone into small particles. Care was taken not to laser tissue. All particles appeared small enough to pass around the stent.     The scope was removed from the ureter, and the cystoscope was used to place a 6F Contour VL stent in the ureter, with the upper coils in the renal pelvis, and the distal coil in the bladder. Retrograde pyelogram on that side confirmed good position and no extravasation of contrast.   The patient was awaken from anesthesia and taken to the PACU in good condition.    I was present for the entire procedure.    Patient Disposition:  PACU              SIGNATURE: Robin Metz MD  DATE: September 25, 2024  TIME: 12:47 PM

## 2024-09-26 ENCOUNTER — TELEPHONE (OUTPATIENT)
Dept: UROLOGY | Facility: MEDICAL CENTER | Age: 49
End: 2024-09-26

## 2024-09-26 NOTE — TELEPHONE ENCOUNTER
Post Op Note    Andrew Mcfadden is a 49 y.o. male s/p  CYSTOSCOPY RETROGRADE PYELOGRAM WITH INSERTION STENT URETERAL; BLADDER STONE EXTRACTION (Left: Bladder) performed 9/25/24 with Dr. Metz.     How would you rate your pain on a scale from 1 to 10, 10 being the worst pain ever? 8  Have you had a fever? No  Have your bowel movements been regular? No pt advised to take stool softener with good hydration  Do you have any difficulty urinating? No  Do you have any other questions or concerns that I can address at this time?     Pt states he has a sharp pain from the stent.  He states that it feels very different from the last time he had a stent.  He stated he doesn't think he can keep the stent for a week.  Advised pt to take the oxybutinin and Norco prescribed to see if this will help.  He stated he took Tylenol and oxybutinin this morning and it did not help.  He is scheduled for 10/3/24 in the [place] office with the nurse.  He knows to contact the office with worsening pain or any other symptoms.

## 2024-09-27 ENCOUNTER — TELEPHONE (OUTPATIENT)
Dept: UROLOGY | Facility: MEDICAL CENTER | Age: 49
End: 2024-09-27

## 2024-09-27 LAB — BACTERIA UR CULT: NORMAL

## 2024-09-28 PROBLEM — N39.0 URINARY TRACT INFECTION: Status: RESOLVED | Noted: 2024-08-29 | Resolved: 2024-09-28

## 2024-09-28 PROBLEM — A41.9 SEPSIS (HCC): Status: RESOLVED | Noted: 2024-08-29 | Resolved: 2024-09-28

## 2024-10-01 ENCOUNTER — PATIENT OUTREACH (OUTPATIENT)
Dept: CASE MANAGEMENT | Facility: OTHER | Age: 49
End: 2024-10-01

## 2024-10-01 NOTE — PROGRESS NOTES
OP.CM NERY received new referral from inpatient case management regarding patient. Andrew is without insurance. He was 100 % eligible for financial assistance for medications. He is to schedule with [facility]. They reached out to him and he was unable to reach.     NERY SUNG was referred for assistance in [place] , and helping him schedule a PCP appointment.      NERY SUNG reached out to number listed. Unable to reach and a voicemail was left for a return call.     Unable to reach letter sent.     Will be closing from care management

## 2024-10-07 ENCOUNTER — PROCEDURE VISIT (OUTPATIENT)
Dept: UROLOGY | Facility: MEDICAL CENTER | Age: 49
End: 2024-10-07

## 2024-10-07 VITALS
HEIGHT: 68 IN | BODY MASS INDEX: 28.49 KG/M2 | SYSTOLIC BLOOD PRESSURE: 132 MMHG | WEIGHT: 188 LBS | DIASTOLIC BLOOD PRESSURE: 78 MMHG

## 2024-10-07 DIAGNOSIS — N20.1 URETERAL CALCULI: Primary | ICD-10-CM

## 2024-10-07 PROCEDURE — 99024 POSTOP FOLLOW-UP VISIT: CPT

## 2024-10-07 NOTE — PROGRESS NOTES
"10/7/2024  Andrew Mcfadden is a 49 y.o. male  [id number]        Diagnosis  Chief Complaint    Ureteral calculi         Patient is s/p left ureteroscopy with stone extraction on 9- with Dr. Metz    Plan  Pt is to contact insurance company to find Urologist who PAR's with his insurance.       Procedure Stent with String Removal    Vitals:    10/07/24 1036   BP: 132/78   BP Location: Left arm   Patient Position: Sitting   Cuff Size: Standard   Weight: 85.3 kg (188 lb)   Height: 5' 8\" (1.727 m)       Stent with string removed intact without difficulty. Reviewed post stent removal symptoms including flank pain, dysuria, and hematuria. Instructed patient to increase oral fluid intake.  Encouraged the use of NSAIDS and other prescribed pain medication as needed for discomfort. Patient instructed to call the office or report to the ER for uncontrolled pain, fever, chills, nausea or vomiting.       Katie Yi RN   "

## (undated) DEVICE — GUIDEWIRE STRGHT TIP 0.035 IN  SOLO PLUS

## (undated) DEVICE — GLOVE SRG BIOGEL 7.5

## (undated) DEVICE — SINGLE PORT MANIFOLD: Brand: NEPTUNE 2

## (undated) DEVICE — 3M™ STERI-STRIP™ COMPOUND BENZOIN TINCTURE 40 BAGS/CARTON 4 CARTONS/CASE C1544: Brand: 3M™ STERI-STRIP™

## (undated) DEVICE — PACK TUR

## (undated) DEVICE — INVIEW CLEAR LEGGINGS: Brand: CONVERTORS

## (undated) DEVICE — TELFA NON-ADHERENT ABSORBENT DRESSING: Brand: TELFA

## (undated) DEVICE — LUBRICANT JELLY SURGILUBE TUBE 2OZ FLIP TOP

## (undated) DEVICE — EXIDINE 4 PCT

## (undated) DEVICE — UROLOGIC DRAIN BAG: Brand: UNBRANDED

## (undated) DEVICE — SCD SEQUENTIAL COMPRESSION COMFORT SLEEVE MEDIUM KNEE LENGTH: Brand: KENDALL SCD

## (undated) DEVICE — SINGLE-USE DIGITAL FLEXIBLE URETEROSCOPE: Brand: APTRA

## (undated) DEVICE — SYRINGE 20ML LL

## (undated) DEVICE — TUBING SUCTION 5MM X 12 FT

## (undated) DEVICE — PREMIUM DRY TRAY LF: Brand: MEDLINE INDUSTRIES, INC.

## (undated) DEVICE — FIBER STD QUANTA 272 MICRON

## (undated) DEVICE — SPECIMEN CONTAINER STERILE PEEL PACK

## (undated) DEVICE — GUARDIAN LVC: Brand: GUARDIAN

## (undated) DEVICE — BASKET SPECIMEN RETRIVAL 1.9FR 120CM